# Patient Record
Sex: FEMALE | Race: BLACK OR AFRICAN AMERICAN | Employment: FULL TIME | URBAN - METROPOLITAN AREA
[De-identification: names, ages, dates, MRNs, and addresses within clinical notes are randomized per-mention and may not be internally consistent; named-entity substitution may affect disease eponyms.]

---

## 2018-01-30 ENCOUNTER — OFFICE VISIT (OUTPATIENT)
Dept: FAMILY MEDICINE CLINIC | Age: 28
End: 2018-01-30

## 2018-01-30 VITALS
OXYGEN SATURATION: 98 % | WEIGHT: 217.5 LBS | SYSTOLIC BLOOD PRESSURE: 100 MMHG | RESPIRATION RATE: 16 BRPM | HEIGHT: 59 IN | DIASTOLIC BLOOD PRESSURE: 68 MMHG | TEMPERATURE: 98.5 F | BODY MASS INDEX: 43.85 KG/M2 | HEART RATE: 74 BPM

## 2018-01-30 DIAGNOSIS — J30.89 NON-SEASONAL ALLERGIC RHINITIS, UNSPECIFIED CHRONICITY, UNSPECIFIED TRIGGER: ICD-10-CM

## 2018-01-30 DIAGNOSIS — J45.40 MODERATE PERSISTENT ASTHMA WITHOUT COMPLICATION: Primary | ICD-10-CM

## 2018-01-30 PROBLEM — E66.01 OBESITY, MORBID (HCC): Status: ACTIVE | Noted: 2018-01-30

## 2018-01-30 RX ORDER — LORATADINE 10 MG/1
10 TABLET ORAL DAILY
Qty: 30 TAB | Refills: 5 | Status: SHIPPED | OUTPATIENT
Start: 2018-01-30 | End: 2021-05-10 | Stop reason: SDUPTHER

## 2018-01-30 RX ORDER — DULOXETIN HYDROCHLORIDE 60 MG/1
60 CAPSULE, DELAYED RELEASE ORAL DAILY
Qty: 30 CAP | Refills: 5 | Status: SHIPPED | OUTPATIENT
Start: 2018-01-30 | End: 2021-05-10 | Stop reason: SDUPTHER

## 2018-01-30 RX ORDER — LORATADINE 10 MG/1
10 TABLET ORAL
COMMUNITY
End: 2018-01-30 | Stop reason: SDUPTHER

## 2018-01-30 RX ORDER — MONTELUKAST SODIUM 10 MG/1
10 TABLET ORAL DAILY
Qty: 30 TAB | Refills: 5 | Status: SHIPPED | OUTPATIENT
Start: 2018-01-30 | End: 2021-05-10 | Stop reason: SDUPTHER

## 2018-01-30 RX ORDER — TRAZODONE HYDROCHLORIDE 100 MG/1
100 TABLET ORAL
Qty: 30 TAB | Refills: 5 | Status: SHIPPED | OUTPATIENT
Start: 2018-01-30 | End: 2021-05-10 | Stop reason: SDUPTHER

## 2018-01-30 RX ORDER — MONTELUKAST SODIUM 10 MG/1
10 TABLET ORAL DAILY
COMMUNITY
End: 2018-01-30 | Stop reason: SDUPTHER

## 2018-01-30 RX ORDER — CITALOPRAM 20 MG/1
TABLET, FILM COATED ORAL DAILY
COMMUNITY
End: 2018-01-30 | Stop reason: ALTCHOICE

## 2018-01-30 NOTE — PROGRESS NOTES
1. Have you been to the ER, urgent care clinic since your last visit? Hospitalized since your last visit? No    2. Have you seen or consulted any other health care providers outside of the [address] [place] [address] since your last visit? Include any pap smears or colon screening. No    Chief Complaint   Patient presents with    Establish Care    Anxiety     x 1 1/2 yrs & has been treated but out of meds for 3 mo.  Medication Refill     Pt states she has anxiety & depression x 1 1/2 yrs and has been treated but ran out of meds for 3 mo.

## 2018-01-30 NOTE — MR AVS SNAPSHOT
[address] [address] 
[phone] Patient: Stan Lanes MRN: [id number] Visit Information Date & Time Provider Department Dept. Phone Encounter #  
 1/30/2018  2:00 PM Kayla Bernal NP [facility] [facility] [phone] [id number] Follow-up Instructions Return in about 3 weeks (around 2/20/2018) for med check. Allergies as of 1/30/2018  Review Complete On: 1/30/2018 By: Kayla Bernal NP Severity Noted Reaction Type Reactions Azithromycin  02/22/2016    Rash Rash with IV infusion. Has taken tablets since without a reaction Dilaudid [Hydromorphone]  02/22/2016    Swelling Morphine  02/22/2016    Swelling Current Immunizations  Never Reviewed No immunizations on file. Not reviewed this visit You Were Diagnosed With   
  
 Codes Comments Moderate persistent asthma without complication    -  Primary ICD-10-CM: J45.40 ICD-9-CM: 493.90 Non-seasonal allergic rhinitis, unspecified chronicity, unspecified trigger     ICD-10-CM: J30.89 ICD-9-CM: 477.8 Vitals BP Pulse Temp Resp Height(growth percentile) Weight(growth percentile) 100/68 74 98.5 °F (36.9 °C) (Oral) 16 4' 11\" (1.499 m) 217 lb 8 oz (98.7 kg) LMP SpO2 BMI OB Status Smoking Status 01/18/2018 (Exact Date) 98% 43.93 kg/m2 Having regular periods Never Smoker Vitals History BMI and BSA Data Body Mass Index Body Surface Area  
 43.93 kg/m 2 2.03 m 2 Preferred Pharmacy Pharmacy Name Phone [facility] [address], [address] [facility] [phone] Your Updated Medication List  
  
   
This list is accurate as of: 1/30/18  2:44 PM.  Always use your most recent med list.  
  
  
  
  
 albuterol 90 mcg/actuation inhaler Commonly known as:  PROVENTIL HFA, VENTOLIN HFA, PROAIR HFA  
 Take 2 Puffs by inhalation every four (4) hours as needed for Wheezing. Indications: ACUTE ASTHMA ATTACK DULoxetine 60 mg capsule Commonly known as:  CYMBALTA Take 1 Cap by mouth daily. loratadine 10 mg tablet Commonly known as:  Lajune [place] Take 1 Tab by mouth daily. montelukast 10 mg tablet Commonly known as:  SINGULAIR Take 1 Tab by mouth daily. traZODone 100 mg tablet Commonly known as:  Lunette Grate Take 1 Tab by mouth nightly. Prescriptions Sent to Pharmacy Refills  
 montelukast (SINGULAIR) 10 mg tablet 5 Sig: Take 1 Tab by mouth daily. Class: Normal  
 Pharmacy: [address] Ph #: [phone] Route: Oral  
 loratadine (CLARITIN) 10 mg tablet 5 Sig: Take 1 Tab by mouth daily. Class: Normal  
 Pharmacy: [address] Ph #: [phone] Route: Oral  
 DULoxetine (CYMBALTA) 60 mg capsule 5 Sig: Take 1 Cap by mouth daily. Class: Normal  
 Pharmacy: [address] Ph #: [phone] Route: Oral  
 traZODone (DESYREL) 100 mg tablet 5 Sig: Take 1 Tab by mouth nightly. Class: Normal  
 Pharmacy: [address] Ph #: [phone] Route: Oral  
  
Follow-up Instructions Return in about 3 weeks (around 2/20/2018) for med check. Introducing [facility]! Maribell Gunn introduces [employer] patient portal. Now you can access parts of your medical record, email your doctor's office, and request medication refills online. 1. In your internet browser, go to [email or web address] 2. Click on the First Time User? Click Here link in the Sign In box. You will see the New Member Sign Up page. 3. Enter your [employer] Access Code exactly as it appears below. You will not need to use this code after youve completed the sign-up process. If you do not sign up before the expiration date, you must request a new code. · [employer] Access Code: [email or web address] Expires: 4/30/2018  2:34 PM 
 
4. Enter the last four digits of your Social Security Number (xxxx) and Date of Birth (mm/dd/yyyy) as indicated and click Submit. You will be taken to the next sign-up page. 5. Create a [employer] ID. This will be your [employer] login ID and cannot be changed, so think of one that is secure and easy to remember. 6. Create a [employer] password. You can change your password at any time. 7. Enter your Password Reset Question and Answer. This can be used at a later time if you forget your password. 8. Enter your e-mail address. You will receive e-mail notification when new information is available in [address] 9. Click Sign Up. You can now view and download portions of your medical record. 10. Click the Download Summary menu link to download a portable copy of your medical information. If you have questions, please visit the Frequently Asked Questions section of the [employer] website. Remember, [employer] is NOT to be used for urgent needs. For medical emergencies, dial 911. Now available from your iPhone and Android! Please provide this summary of care documentation to your next provider. Your primary care clinician is listed as NONE. If you have any questions after today's visit, please call [phone].

## 2018-01-31 NOTE — PROGRESS NOTES
HISTORY OF PRESENT ILLNESS  Eris Spence is a 33 y.o. female. HPI  New patient to the practice  Long pmh of depression  Used to be on celexa but stopped because it did not feel effective  Stress with work and home life  Also states that she does not sleep well and toss and turn all night due to worry  Needs to get refill of her asthma/allergy meds, takes year round, sx stable currently    The FamHx, SocHx, MedHx, Medication, and Allergy lists have been reviewed and updated in the chart. ROS  A comprehensive review of system was obtained and negative except findings in the HPI    Visit Vitals    /68    Pulse 74    Temp 98.5 °F (36.9 °C) (Oral)    Resp 16    Ht 4' 11\" (1.499 m)    Wt 217 lb 8 oz (98.7 kg)    LMP 01/18/2018 (Exact Date)    SpO2 98%    BMI 43.93 kg/m2     Physical Exam   Constitutional: She is oriented to person, place, and time. She appears well-developed and well-nourished. Neck: No JVD present. Cardiovascular: Normal rate, regular rhythm and intact distal pulses. Exam reveals no gallop and no friction rub. No murmur heard. Pulmonary/Chest: Effort normal and breath sounds normal. No respiratory distress. She has no wheezes. Musculoskeletal: She exhibits no edema. Neurological: She is alert and oriented to person, place, and time. Skin: Skin is warm. Nursing note and vitals reviewed. ASSESSMENT and PLAN  Encounter Diagnoses   Name Primary?     Moderate persistent asthma without complication Yes    Non-seasonal allergic rhinitis, unspecified chronicity, unspecified trigger  Anxiety      Orders Placed This Encounter    montelukast (SINGULAIR) 10 mg tablet    loratadine (CLARITIN) 10 mg tablet    DULoxetine (CYMBALTA) 60 mg capsule    traZODone (DESYREL) 100 mg tablet     Refills added to her allergy/asthma meds  Start cymbalta 60mg a day and trazodone for sleep  Reviewed how to take and what to expect  Recheck 3 weeks    I have discussed the diagnosis with the patient and the intended plan as seen in the above orders. The patient has received an after-visit summary and questions were answered concerning future plans. Patient conveyed understanding of the plan at the time of the visit.     Malika Scott MSN, ANP  1/30/2018

## 2018-02-20 ENCOUNTER — OFFICE VISIT (OUTPATIENT)
Dept: FAMILY MEDICINE CLINIC | Age: 28
End: 2018-02-20

## 2018-02-20 VITALS
OXYGEN SATURATION: 98 % | HEIGHT: 59 IN | SYSTOLIC BLOOD PRESSURE: 118 MMHG | RESPIRATION RATE: 16 BRPM | BODY MASS INDEX: 43.57 KG/M2 | TEMPERATURE: 98.8 F | DIASTOLIC BLOOD PRESSURE: 72 MMHG | HEART RATE: 78 BPM | WEIGHT: 216.13 LBS

## 2018-02-20 DIAGNOSIS — J45.901 MODERATE ASTHMA WITH ACUTE EXACERBATION, UNSPECIFIED WHETHER PERSISTENT: ICD-10-CM

## 2018-02-20 DIAGNOSIS — F32.A DEPRESSION, UNSPECIFIED DEPRESSION TYPE: Primary | ICD-10-CM

## 2018-02-20 RX ORDER — ALBUTEROL SULFATE 90 UG/1
2 AEROSOL, METERED RESPIRATORY (INHALATION)
Qty: 1 INHALER | Refills: 5 | Status: SHIPPED | OUTPATIENT
Start: 2018-02-20 | End: 2021-05-10 | Stop reason: SDUPTHER

## 2018-02-20 NOTE — PROGRESS NOTES
HISTORY OF PRESENT ILLNESS  Lashay Peterson is a 33 y.o. female. HPI  Started cymbalta and trazodone last visit for anxiety and depression  Sleep patterns are much improved  Does not see a big difference in stress yet, maybe 10%  Willing to give it more time    Needs albuterol MDI refill for seasonal allergies/asthma    ROS  A comprehensive review of system was obtained and negative except findings in the HPI    Visit Vitals    /72    Pulse 78    Temp 98.8 °F (37.1 °C) (Oral)    Resp 16    Ht 4' 11\" (1.499 m)    Wt 216 lb 2 oz (98 kg)    SpO2 98%    BMI 43.65 kg/m2     Physical Exam   Constitutional: She is oriented to person, place, and time. She appears well-developed and well-nourished. Neck: No JVD present. Cardiovascular: Normal rate, regular rhythm and intact distal pulses. Exam reveals no gallop and no friction rub. No murmur heard. Pulmonary/Chest: Effort normal and breath sounds normal. No respiratory distress. She has no wheezes. Musculoskeletal: She exhibits no edema. Neurological: She is alert and oriented to person, place, and time. Skin: Skin is warm. Nursing note and vitals reviewed. ASSESSMENT and PLAN  Encounter Diagnoses   Name Primary?  Depression, unspecified depression type Yes    Moderate asthma with acute exacerbation, unspecified whether persistent      Orders Placed This Encounter    albuterol (PROVENTIL HFA, VENTOLIN HFA, PROAIR HFA) 90 mcg/actuation inhaler     Given refill of albuterol MDI for prn use  Will recheck 3 weeks and give cymbalta a longer chance to work  Cont trazodone for sleep    I have discussed the diagnosis with the patient and the intended plan as seen in the above orders. The patient has received an after-visit summary and questions were answered concerning future plans. Patient conveyed understanding of the plan at the time of the visit.     Max Yi, MSN, ANP  2/20/2018

## 2018-02-20 NOTE — MR AVS SNAPSHOT
[address] [address] 
[phone] Patient: Chantell Dwyer MRN: [id number] Visit Information Date & Time Provider Department Dept. Phone Encounter #  
 2/20/2018  8:45 AM Linda Kohler NP [address] [facility] [phone] [id number] Follow-up Instructions Return in about 3 weeks (around 3/13/2018) for med check. Upcoming Health Maintenance Date Due  
 HPV AGE 9Y-34Y (1 of 3 - Female 3 Dose Series) 2/24/2002 Pneumococcal 19-64 Medium Risk (1 of 1 - PPSV23) 2/24/2010 DTaP/Tdap/Td series (1 - Tdap) 2/24/2012 PAP AKA CERVICAL CYTOLOGY 2/24/2012 Influenza Age 5 to Adult 8/1/2017 Allergies as of 2/20/2018  Review Complete On: 2/20/2018 By: Gomez Murdock LPN Severity Noted Reaction Type Reactions Azithromycin  02/22/2016    Rash Rash with IV infusion. Has taken tablets since without a reaction Dilaudid [Hydromorphone]  02/22/2016    Swelling Morphine  02/22/2016    Swelling Current Immunizations  Never Reviewed No immunizations on file. Not reviewed this visit You Were Diagnosed With   
  
 Codes Comments Depression, unspecified depression type    -  Primary ICD-10-CM: F32.9 ICD-9-CM: 899 Moderate asthma with acute exacerbation, unspecified whether persistent     ICD-10-CM: J45.901 ICD-9-CM: 922.59 Vitals BP Pulse Temp Resp Height(growth percentile) Weight(growth percentile) 118/72 78 98.8 °F (37.1 °C) (Oral) 16 4' 11\" (1.499 m) 216 lb 2 oz (98 kg) SpO2 BMI OB Status Smoking Status 98% 43.65 kg/m2 Having regular periods Never Smoker Vitals History BMI and BSA Data Body Mass Index Body Surface Area  
 43.65 kg/m 2 2.02 m 2 Preferred Pharmacy Pharmacy Name Phone [facility] [address], [address] [facility] [phone] Your Updated Medication List  
  
   
This list is accurate as of: 2/20/18  9:18 AM.  Always use your most recent med list.  
  
  
  
  
 albuterol 90 mcg/actuation inhaler Commonly known as:  PROVENTIL HFA, VENTOLIN HFA, PROAIR HFA Take 2 Puffs by inhalation every four (4) hours as needed for Wheezing. Indications: Acute Asthma Attack DULoxetine 60 mg capsule Commonly known as:  CYMBALTA Take 1 Cap by mouth daily. loratadine 10 mg tablet Commonly known as:  [place] Last Take 1 Tab by mouth daily. montelukast 10 mg tablet Commonly known as:  SINGULAIR Take 1 Tab by mouth daily. traZODone 100 mg tablet Commonly known as:  Stevphen Hacker Take 1 Tab by mouth nightly. Prescriptions Sent to Pharmacy Refills  
 albuterol (PROVENTIL HFA, VENTOLIN HFA, PROAIR HFA) 90 mcg/actuation inhaler 5 Sig: Take 2 Puffs by inhalation every four (4) hours as needed for Wheezing. Indications: Acute Asthma Attack Class: Normal  
 Pharmacy: [facility] [address] [place] [address] #: [phone] Route: Inhalation Follow-up Instructions Return in about 3 weeks (around 3/13/2018) for med check. Introducing [facility]! Gillian Jacob introduces [employer] patient portal. Now you can access parts of your medical record, email your doctor's office, and request medication refills online. 1. In your internet browser, go to [email or web address] 2. Click on the First Time User? Click Here link in the Sign In box. You will see the New Member Sign Up page. 3. Enter your [employer] Access Code exactly as it appears below. You will not need to use this code after youve completed the sign-up process. If you do not sign up before the expiration date, you must request a new code. · [employer] Access Code: [email or web address] Expires: 4/30/2018  2:34 PM 
 
 4. Enter the last four digits of your Social Security Number (xxxx) and Date of Birth (mm/dd/yyyy) as indicated and click Submit. You will be taken to the next sign-up page. 5. Create a [employer] ID. This will be your [employer] login ID and cannot be changed, so think of one that is secure and easy to remember. 6. Create a [employer] password. You can change your password at any time. 7. Enter your Password Reset Question and Answer. This can be used at a later time if you forget your password. 8. Enter your e-mail address. You will receive e-mail notification when new information is available in [address] 9. Click Sign Up. You can now view and download portions of your medical record. 10. Click the Download Summary menu link to download a portable copy of your medical information. If you have questions, please visit the Frequently Asked Questions section of the [employer] website. Remember, [employer] is NOT to be used for urgent needs. For medical emergencies, dial 911. Now available from your iPhone and Android! Please provide this summary of care documentation to your next provider. Your primary care clinician is listed as NONE. If you have any questions after today's visit, please call [phone].

## 2018-02-20 NOTE — PROGRESS NOTES
1. Have you been to the ER, urgent care clinic since your last visit? Hospitalized since your last visit? No    2. Have you seen or consulted any other health care providers outside of the [address] [place] [address] since your last visit? Include any pap smears or colon screening.  No    Chief Complaint   Patient presents with    Follow-up     3 wk f/u, med ck

## 2018-08-15 ENCOUNTER — OFFICE VISIT (OUTPATIENT)
Dept: FAMILY MEDICINE CLINIC | Age: 28
End: 2018-08-15

## 2018-08-15 VITALS
TEMPERATURE: 98.3 F | HEART RATE: 60 BPM | BODY MASS INDEX: 43.75 KG/M2 | WEIGHT: 217 LBS | SYSTOLIC BLOOD PRESSURE: 109 MMHG | HEIGHT: 59 IN | OXYGEN SATURATION: 99 % | RESPIRATION RATE: 16 BRPM | DIASTOLIC BLOOD PRESSURE: 72 MMHG

## 2018-08-15 DIAGNOSIS — F41.9 ANXIETY: Primary | ICD-10-CM

## 2018-08-15 NOTE — PATIENT INSTRUCTIONS

## 2018-08-15 NOTE — PROGRESS NOTES
Chief Complaint   Patient presents with    Medication Evaluation     Cymbalta and Trazodone     she is a 33y.o. year old female who presents for evalution. Pt states was seeing Gurpreet Neto at beginning of year, was given Trazodone and Cymbalta. Then stopped coming because she did not feel like medication was working. Pt states has been cycling through different family practice providers and practices and no one can figure out how to treat her appropraitely. Pt states she is unsure of her diagnosis and feels like no one is treating her properly. Reviewed PmHx, RxHx, FmHx, SocHx, AllgHx and updated and dated in the chart. Review of Systems - negative except as listed above in the HPI    Objective:     Vitals:    08/15/18 0859   BP: 109/72   Pulse: 60   Resp: 16   Temp: 98.3 °F (36.8 °C)   TempSrc: Oral   SpO2: 99%   Weight: 217 lb (98.4 kg)   Height: 4' 11\" (1.499 m)     Physical Examination: General appearance - alert, well appearing, and in no distress  Mental status - alert, oriented to person, place, and time, anxious  Chest - clear to auscultation, no wheezes, rales or rhonchi, symmetric air entry  Heart - normal rate, regular rhythm, normal S1, S2, no murmurs, rubs, clicks or gallops    Assessment/ Plan:   Diagnoses and all orders for this visit:    1. Anxiety  -     REFERRAL TO PSYCHIATRY  Pt given names of 3 different psychiatrists for treatment and evaluation. Pt voiced understanding regarding plan of care. Follow-up Disposition:  Return if symptoms worsen or fail to improve. I have discussed the diagnosis with the patient and the intended plan as seen in the above orders. The patient has received an after-visit summary and questions were answered concerning future plans.      Medication Side Effects and Warnings were discussed with patient    Lorrie Smith NP

## 2018-08-15 NOTE — MR AVS SNAPSHOT
[address] [address] 
[phone] Patient: Garima Shearer MRN: [id number] LFP:4/43/8369 Visit Information Date & Time Provider Department Dept. Phone Encounter #  
 8/15/2018  8:45 AM Joby Johnson NP 1566 [facility] [phone] [id number] Follow-up Instructions Return if symptoms worsen or fail to improve. Upcoming Health Maintenance Date Due Pneumococcal 19-64 Medium Risk (1 of 1 - PPSV23) 2/24/2010 DTaP/Tdap/Td series (1 - Tdap) 2/24/2012 PAP AKA CERVICAL CYTOLOGY 2/24/2012 Influenza Age 5 to Adult 8/1/2018 Allergies as of 8/15/2018  Review Complete On: 8/15/2018 By: Maribel Tinajero LPN Severity Noted Reaction Type Reactions Azithromycin  02/22/2016    Rash Rash with IV infusion. Has taken tablets since without a reaction Dilaudid [Hydromorphone]  02/22/2016    Swelling Morphine  02/22/2016    Swelling Current Immunizations  Never Reviewed No immunizations on file. Not reviewed this visit You Were Diagnosed With   
  
 Codes Comments Anxiety    -  Primary ICD-10-CM: F41.9 ICD-9-CM: 300.00 Vitals BP Pulse Temp Resp Height(growth percentile) Weight(growth percentile) 109/72 60 98.3 °F (36.8 °C) (Oral) 16 4' 11\" (1.499 m) 217 lb (98.4 kg) LMP SpO2 BMI OB Status Smoking Status 08/04/2018 99% 43.83 kg/m2 Having regular periods Never Smoker Vitals History BMI and BSA Data Body Mass Index Body Surface Area  
 43.83 kg/m 2 2.02 m 2 [facility] [phone] Your Updated Medication List  
  
   
This list is accurate as of 8/15/18  9:08 AM.  Always use your most recent med list.  
  
  
  
  
 albuterol 90 mcg/actuation inhaler Commonly known as:  PROVENTIL HFA, VENTOLIN HFA, PROAIR HFA  
 Take 2 Puffs by inhalation every four (4) hours as needed for Wheezing. Indications: Acute Asthma Attack DULoxetine 60 mg capsule Commonly known as:  CYMBALTA Take 1 Cap by mouth daily. loratadine 10 mg tablet Commonly known as:  Drena Magic Take 1 Tab by mouth daily. montelukast 10 mg tablet Commonly known as:  SINGULAIR Take 1 Tab by mouth daily. traZODone 100 mg tablet Commonly known as:  Lemmie Sarah Take 1 Tab by mouth nightly. We Performed the Following REFERRAL TO PSYCHIATRY [REF91 Custom] Follow-up Instructions Return if symptoms worsen or fail to improve. Referral Information Referral ID Referred By Referred To  
  
 [id number] Mady PARKER, [address] [facility] [address] [address] Phone: [phone] Fax: [phone] Visits Status Start Date End Date 1 New Request 8/15/18 8/15/19 If your referral has a status of pending review or denied, additional information will be sent to support the outcome of this decision. Patient Instructions Anxiety Disorder: Care Instructions Your Care Instructions Anxiety is a normal reaction to stress. Difficult situations can cause you to have symptoms such as sweaty palms and a nervous feeling. In an anxiety disorder, the symptoms are far more severe. Constant worry, muscle tension, trouble sleeping, nausea and diarrhea, and other symptoms can make normal daily activities difficult or impossible. These symptoms may occur for no reason, and they can affect your work, school, or social life. Medicines, counseling, and self-care can all help. Follow-up care is a key part of your treatment and safety. Be sure to make and go to all appointments, and call your doctor if you are having problems.  It's also a good idea to know your test results and keep a list of the medicines you take. How can you care for yourself at home? · Take medicines exactly as directed. Call your doctor if you think you are having a problem with your medicine. · Go to your counseling sessions and follow-up appointments. · Recognize and accept your anxiety. Then, when you are in a situation that makes you anxious, say to yourself, \"This is not an emergency. I feel uncomfortable, but I am not in danger. I can keep going even if I feel anxious. \" · Be kind to your body: ¨ Relieve tension with exercise or a massage. ¨ Get enough rest. 
¨ Avoid alcohol, caffeine, nicotine, and illegal drugs. They can increase your anxiety level and cause sleep problems. ¨ Learn and do relaxation techniques. See below for more about these techniques. · Engage your mind. Get out and do something you enjoy. Go to a funny movie, or take a walk or hike. Plan your day. Having too much or too little to do can make you anxious. · Keep a record of your symptoms. Discuss your fears with a good friend or family member, or join a support group for people with similar problems. Talking to others sometimes relieves stress. · Get involved in social groups, or volunteer to help others. Being alone sometimes makes things seem worse than they are. · Get at least 30 minutes of exercise on most days of the week to relieve stress. Walking is a good choice. You also may want to do other activities, such as running, swimming, cycling, or playing tennis or team sports. Relaxation techniques Do relaxation exercises 10 to 20 minutes a day. You can play soothing, relaxing music while you do them, if you wish. · Tell others in your house that you are going to do your relaxation exercises. Ask them not to disturb you. · Find a comfortable place, away from all distractions and noise. · Lie down on your back, or sit with your back straight. · Focus on your breathing. Make it slow and steady. · Breathe in through your nose. Breathe out through either your nose or mouth. · Breathe deeply, filling up the area between your navel and your rib cage. Breathe so that your belly goes up and down. · Do not hold your breath. · Breathe like this for 5 to 10 minutes. Notice the feeling of calmness throughout your whole body. As you continue to breathe slowly and deeply, relax by doing the following for another 5 to 10 minutes: · Tighten and relax each muscle group in your body. You can begin at your toes and work your way up to your head. · Imagine your muscle groups relaxing and becoming heavy. · Empty your mind of all thoughts. · Let yourself relax more and more deeply. · Become aware of the state of calmness that surrounds you. · When your relaxation time is over, you can bring yourself back to alertness by moving your fingers and toes and then your hands and feet and then stretching and moving your entire body. Sometimes people fall asleep during relaxation, but they usually wake up shortly afterward. · Always give yourself time to return to full alertness before you drive a car or do anything that might cause an accident if you are not fully alert. Never play a relaxation tape while you drive a car. When should you call for help? Call 911 anytime you think you may need emergency care. For example, call if: 
  · You feel you cannot stop from hurting yourself or someone else.  
Chu Jimenez the numbers for these national suicide hotlines: 1-453-828-TALK (5-568.507.9612) and 4-800-EVZHYZJ (7-965.180.1661). If you or someone you know talks about suicide or feeling hopeless, get help right away. 
 Watch closely for changes in your health, and be sure to contact your doctor if: 
  · You have anxiety or fear that affects your life.  
  · You have symptoms of anxiety that are new or different from those you had before. Where can you learn more? Go to http://mandy-jagdish.info/. Enter P754 in the search box to learn more about \"Anxiety Disorder: Care Instructions. \" Current as of: December 7, 2017 Content Version: 11.7 © 4105-3626 [employer]. Care instructions adapted under license by [employer] (which disclaims liability or warranty for this information). If you have questions about a medical condition or this instruction, always ask your healthcare professional. [address] any warranty or liability for your use of this information. Introducing [facility] & HEALTH SERVICES! Lynn Whitmore introduces AppHarbor patient portal. Now you can access parts of your medical record, email your doctor's office, and request medication refills online. 1. In your internet browser, go to [email or web address] 2. Click on the First Time User? Click Here link in the Sign In box. You will see the New Member Sign Up page. 3. Enter your AppHarbor Access Code exactly as it appears below. You will not need to use this code after youve completed the sign-up process. If you do not sign up before the expiration date, you must request a new code. · AppHarbor Access Code: [email or web address] Expires: 11/13/2018  9:08 AM 
 
4. Enter the last four digits of your Social Security Number (xxxx) and Date of Birth (mm/dd/yyyy) as indicated and click Submit. You will be taken to the next sign-up page. 5. Create a AppHarbor ID. This will be your AppHarbor login ID and cannot be changed, so think of one that is secure and easy to remember. 6. Create a AppHarbor password. You can change your password at any time. 7. Enter your Password Reset Question and Answer. This can be used at a later time if you forget your password. 8. Enter your e-mail address. You will receive e-mail notification when new information is available in [address] 9. Click Sign Up. You can now view and download portions of your medical record. 10. Click the Download Summary menu link to download a portable copy of your medical information. If you have questions, please visit the Frequently Asked Questions section of the [employer] website. Remember, Chibwe is NOT to be used for urgent needs. For medical emergencies, dial 911. Now available from your iPhone and Android! Please provide this summary of care documentation to your next provider. Your primary care clinician is listed as NONE. If you have any questions after today's visit, please call [phone].

## 2018-08-15 NOTE — PROGRESS NOTES
1. Have you been to the ER, urgent care clinic since your last visit? Hospitalized since your last visit? Yes, [facility], 8/13/18    2. Have you seen or consulted any other health care providers outside of the [address] [place] [address] since your last visit? Include any pap smears or colon screening.  No   Chief Complaint   Patient presents with    Medication Evaluation     Cymbalta and Trazodone

## 2018-09-13 ENCOUNTER — OFFICE VISIT (OUTPATIENT)
Dept: FAMILY MEDICINE CLINIC | Age: 28
End: 2018-09-13

## 2018-09-13 VITALS
BODY MASS INDEX: 43.95 KG/M2 | DIASTOLIC BLOOD PRESSURE: 82 MMHG | SYSTOLIC BLOOD PRESSURE: 120 MMHG | HEART RATE: 63 BPM | HEIGHT: 59 IN | WEIGHT: 218 LBS | TEMPERATURE: 98.9 F | RESPIRATION RATE: 16 BRPM | OXYGEN SATURATION: 99 %

## 2018-09-13 DIAGNOSIS — N92.6 MISSED MENSES: Primary | ICD-10-CM

## 2018-09-13 NOTE — PROGRESS NOTES
1. Have you been to the ER, urgent care clinic since your last visit? Hospitalized since your last visit? Yes, [facility], 9/12/18    2. Have you seen or consulted any other health care providers outside of the [address] [place] [address] since your last visit? Include any pap smears or colon screening.  No     Chief Complaint   Patient presents with    Menstrual Problem     Last Period 7/20/18

## 2018-09-13 NOTE — MR AVS SNAPSHOT
[address] [address] 
[phone] Patient: Savannah Keita MRN: [id number] TDM:8/01/3888 Visit Information Date & Time Provider Department Dept. Phone Encounter #  
 9/13/2018  7:15 AM Jimmy Matt NP [address] [facility] [phone] [id number] Follow-up Instructions Return if symptoms worsen or fail to improve. Upcoming Health Maintenance Date Due Pneumococcal 19-64 Medium Risk (1 of 1 - PPSV23) 2/24/2010 DTaP/Tdap/Td series (1 - Tdap) 2/24/2012 PAP AKA CERVICAL CYTOLOGY 2/24/2012 Influenza Age 5 to Adult 8/1/2018 Allergies as of 9/13/2018  Review Complete On: 9/13/2018 By: Cecile Pryor LPN Severity Noted Reaction Type Reactions Azithromycin  02/22/2016    Rash Rash with IV infusion. Has taken tablets since without a reaction Dilaudid [Hydromorphone]  02/22/2016    Swelling Morphine  02/22/2016    Swelling Current Immunizations  Never Reviewed No immunizations on file. Not reviewed this visit You Were Diagnosed With   
  
 Codes Comments Missed menses    -  Primary ICD-10-CM: N92.6 ICD-9-CM: 626.4 Vitals BP Pulse Temp Resp Height(growth percentile) Weight(growth percentile) 120/82 63 98.9 °F (37.2 °C) (Oral) 16 4' 11\" (1.499 m) 218 lb (98.9 kg) LMP SpO2 BMI OB Status Smoking Status 07/20/2018 (Exact Date) 99% 44.03 kg/m2 Unknown Never Smoker BMI and BSA Data Body Mass Index Body Surface Area 44.03 kg/m 2 2.03 m 2 Preferred Pharmacy Pharmacy Name Phone [facility], [address] [place] [address] [phone] Your Updated Medication List  
  
   
This list is accurate as of 9/13/18  7:37 AM.  Always use your most recent med list.  
  
  
  
  
 albuterol 90 mcg/actuation inhaler Commonly known as:  PROVENTIL HFA, VENTOLIN HFA, PROAIR HFA  
 Take 2 Puffs by inhalation every four (4) hours as needed for Wheezing. Indications: Acute Asthma Attack DULoxetine 60 mg capsule Commonly known as:  CYMBALTA Take 1 Cap by mouth daily. loratadine 10 mg tablet Commonly known as:  Lorie Jessica Take 1 Tab by mouth daily. montelukast 10 mg tablet Commonly known as:  SINGULAIR Take 1 Tab by mouth daily. traZODone 100 mg tablet Commonly known as:  Peck Bump Take 1 Tab by mouth nightly. We Performed the Following BETA HCG, QT [id number] CPT(R)] [facility] AND LH [19668 CPT(R)] TSH 3RD GENERATION [52502 CPT(R)] Follow-up Instructions Return if symptoms worsen or fail to improve. Introducing [facility] & HEALTH SERVICES! [facility] introduces ZAPR patient portal. Now you can access parts of your medical record, email your doctor's office, and request medication refills online. 1. In your internet browser, go to [email or web address] 2. Click on the First Time User? Click Here link in the Sign In box. You will see the New Member Sign Up page. 3. Enter your ZAPR Access Code exactly as it appears below. You will not need to use this code after youve completed the sign-up process. If you do not sign up before the expiration date, you must request a new code. · ZAPR Access Code: [email or web address] Expires: 11/13/2018  9:08 AM 
 
4. Enter the last four digits of your Social Security Number (xxxx) and Date of Birth (mm/dd/yyyy) as indicated and click Submit. You will be taken to the next sign-up page. 5. Create a ZAPR ID. This will be your ZAPR login ID and cannot be changed, so think of one that is secure and easy to remember. 6. Create a ZAPR password. You can change your password at any time. 7. Enter your Password Reset Question and Answer. This can be used at a later time if you forget your password. 8. Enter your e-mail address.  You will receive e-mail notification when new information is available in Privepass. 9. Click Sign Up. You can now view and download portions of your medical record. 10. Click the Download Summary menu link to download a portable copy of your medical information. If you have questions, please visit the Frequently Asked Questions section of the Privepass website. Remember, Privepass is NOT to be used for urgent needs. For medical emergencies, dial 911. Now available from your iPhone and Android! Please provide this summary of care documentation to your next provider. Your primary care clinician is listed as NONE. If you have any questions after today's visit, please call [phone].

## 2018-09-13 NOTE — PROGRESS NOTES
Chief Complaint   Patient presents with    Menstrual Problem     Last Period 7/20/18     she is a 33y.o. year old female who presents for evalution. Pt states has not seen GYN for past 6 years. Pt states has tubes tied. States typically has regular periods, has not had cycle since 7/20/18. Went to [facility] yesterday but refused any pregnancy testing. Only other thing they checked was pt's urine, then was given Bentyl. Here today to discuss. Pt states typically with periods will get sharp pain on R side of pelvis. States has been getting pain intermittently for past few days but period has not yet started. Pt was seen here about a month ago for anxiety but states not worse than normal.  Does not believe this effects her periods. Reviewed PmHx, RxHx, FmHx, SocHx, AllgHx and updated and dated in the chart. Review of Systems - negative except as listed above in the HPI    Objective:     Vitals:    09/13/18 0726   BP: 120/82   Pulse: 63   Resp: 16   Temp: 98.9 °F (37.2 °C)   TempSrc: Oral   SpO2: 99%   Weight: 218 lb (98.9 kg)   Height: 4' 11\" (1.499 m)     Physical Examination: General appearance - alert, well appearing, and in no distress  Chest - clear to auscultation, no wheezes, rales or rhonchi, symmetric air entry  Heart - normal rate, regular rhythm, normal S1, S2, no murmurs, rubs, clicks or gallops  Pelvic - exam declined by the patient    Assessment/ Plan:   Diagnoses and all orders for this visit:    1. Missed menses  -     TSH 3RD GENERATION  -     FSH AND LH  -     BETA HCG, QT  Will decide on F/U after reviewing labs. Discussed obtaining US if all labs normal which pt would like to have done. Pt voiced understanding regarding plan of care. Follow-up Disposition:  Return if symptoms worsen or fail to improve. I have discussed the diagnosis with the patient and the intended plan as seen in the above orders.   The patient has received an after-visit summary and questions were answered concerning future plans.      Medication Side Effects and Warnings were discussed with patient    Palma Sam NP

## 2018-09-14 ENCOUNTER — TELEPHONE (OUTPATIENT)
Dept: FAMILY MEDICINE CLINIC | Age: 28
End: 2018-09-14

## 2018-09-14 LAB
FSH SERPL-ACNC: 2.1 MIU/ML
HCG INTACT+B SERPL-ACNC: <1 MIU/ML
LH SERPL-ACNC: 5.3 MIU/ML
TSH SERPL DL<=0.005 MIU/L-ACNC: 1.99 UIU/ML (ref 0.45–4.5)

## 2018-09-14 NOTE — PROGRESS NOTES
Please inform pt all labs came back normal, pregnancy test was negative. Would she like me to order pelvic US for further evaluation now or give it another month to see if cycle starts again?     Thanks,  N

## 2018-09-14 NOTE — PROGRESS NOTES
Notified pt of results per Jenni Lofton. Pt is going to wait another month before having US. Pt will call back if cycle does not start again.

## 2021-05-10 ENCOUNTER — OFFICE VISIT (OUTPATIENT)
Dept: FAMILY MEDICINE CLINIC | Age: 31
End: 2021-05-10

## 2021-05-10 VITALS
HEART RATE: 63 BPM | SYSTOLIC BLOOD PRESSURE: 106 MMHG | RESPIRATION RATE: 18 BRPM | OXYGEN SATURATION: 98 % | BODY MASS INDEX: 42.54 KG/M2 | TEMPERATURE: 98.5 F | HEIGHT: 59 IN | WEIGHT: 211 LBS | DIASTOLIC BLOOD PRESSURE: 69 MMHG

## 2021-05-10 DIAGNOSIS — J45.41 MODERATE PERSISTENT ASTHMA WITH ACUTE EXACERBATION: Primary | ICD-10-CM

## 2021-05-10 DIAGNOSIS — F41.9 ANXIETY: ICD-10-CM

## 2021-05-10 DIAGNOSIS — F32.A DEPRESSION, UNSPECIFIED DEPRESSION TYPE: ICD-10-CM

## 2021-05-10 PROCEDURE — 99214 OFFICE O/P EST MOD 30 MIN: CPT | Performed by: NURSE PRACTITIONER

## 2021-05-10 RX ORDER — ALBUTEROL SULFATE 90 UG/1
2 AEROSOL, METERED RESPIRATORY (INHALATION)
Qty: 1 INHALER | Refills: 5 | Status: SHIPPED | OUTPATIENT
Start: 2021-05-10 | End: 2022-04-18 | Stop reason: SDUPTHER

## 2021-05-10 RX ORDER — TRAZODONE HYDROCHLORIDE 100 MG/1
100 TABLET ORAL
Qty: 30 TAB | Refills: 5 | Status: SHIPPED | OUTPATIENT
Start: 2021-05-10 | End: 2022-03-23 | Stop reason: SDUPTHER

## 2021-05-10 RX ORDER — LORATADINE 10 MG/1
10 TABLET ORAL DAILY
Qty: 30 TAB | Refills: 5 | Status: SHIPPED | OUTPATIENT
Start: 2021-05-10 | End: 2022-04-22 | Stop reason: SDUPTHER

## 2021-05-10 RX ORDER — DULOXETIN HYDROCHLORIDE 60 MG/1
60 CAPSULE, DELAYED RELEASE ORAL DAILY
Qty: 30 CAP | Refills: 5 | Status: SHIPPED | OUTPATIENT
Start: 2021-05-10 | End: 2022-03-23 | Stop reason: SDUPTHER

## 2021-05-10 RX ORDER — MONTELUKAST SODIUM 10 MG/1
10 TABLET ORAL DAILY
Qty: 30 TAB | Refills: 5 | Status: SHIPPED | OUTPATIENT
Start: 2021-05-10 | End: 2022-03-23 | Stop reason: SDUPTHER

## 2021-05-10 NOTE — PROGRESS NOTES
Chief Complaint   Patient presents with    Asthma     Pt in office today for asthma   -pt states that [facility]  -pt states she was given an antibiotc and prednisone  -pt states she is done with the medication given and she states that she is still having mucous and SOB    1. Have you been to the ER, urgent care clinic since your last visit? Hospitalized since your last visit? [facility]    2. Have you seen or consulted any other health care providers outside of the [address] [place] [address] since your last visit? Include any pap smears or colon screening.  No     Pt has no other concerns

## 2021-05-10 NOTE — PROGRESS NOTES
HISTORY OF PRESENT ILLNESS  Samantha Manuel is a 28 y.o. female. HPI  Chief Complaint   Patient presents with    Asthma     Pt in office today for asthma   -pt states that [facility]  -pt states she was given an antibiotc and prednisone  -pt states she is done with the medication given and she states that she is still having mucous and SOB    She is also having issues with her mood and anxiety  Used to be on cymbalta and trazodone  Wants to restart    ROS  A comprehensive review of system was obtained and negative except findings in the HPI    Visit Vitals  /69 (BP 1 Location: Right arm, BP Patient Position: Sitting)   Pulse 63   Temp 98.5 °F (36.9 °C) (Oral)   Resp 18   Ht 4' 11\" (1.499 m)   Wt 211 lb (95.7 kg)   LMP 04/25/2021   SpO2 98%   BMI 42.62 kg/m²     Physical Exam  Vitals signs and nursing note reviewed. Constitutional:       Appearance: She is well-developed. Comments:      Neck:      Vascular: No JVD. Cardiovascular:      Rate and Rhythm: Normal rate and regular rhythm. Heart sounds: No murmur. No friction rub. No gallop. Pulmonary:      Effort: Pulmonary effort is normal. No respiratory distress. Breath sounds: Wheezing present. No rhonchi. Comments: Distant throughout  Skin:     General: Skin is warm. Neurological:      Mental Status: She is alert and oriented to person, place, and time. ASSESSMENT and PLAN  Encounter Diagnoses   Name Primary?     Moderate persistent asthma with acute exacerbation Yes    Anxiety     Depression, unspecified depression type      Orders Placed This Encounter    albuterol (PROVENTIL HFA, VENTOLIN HFA, PROAIR HFA) 90 mcg/actuation inhaler    montelukast (Singulair) 10 mg tablet    loratadine (CLARITIN) 10 mg tablet    DULoxetine (CYMBALTA) 60 mg capsule    traZODone (DESYREL) 100 mg tablet     Restart her singulair and albuterol  May need to start a daily steroid inhaler pending improvement  Restart Cymbalta and trazodone as well  Reviewed adr/se of med    Follow-up and Dispositions    · Return for 3 weeks asthma and anxiety recheck. I have discussed the diagnosis with the patient and the intended plan as seen in the above orders. The patient has received an after-visit summary and questions were answered concerning future plans. Patient conveyed understanding of the plan at the time of the visit.     Soni Bundy MSN, ANP  5/10/2021

## 2022-03-19 PROBLEM — E66.01 OBESITY, MORBID (HCC): Status: ACTIVE | Noted: 2018-01-30

## 2022-03-23 DIAGNOSIS — F32.A DEPRESSION, UNSPECIFIED DEPRESSION TYPE: ICD-10-CM

## 2022-03-23 DIAGNOSIS — F41.9 ANXIETY: ICD-10-CM

## 2022-03-23 DIAGNOSIS — J45.41 MODERATE PERSISTENT ASTHMA WITH ACUTE EXACERBATION: ICD-10-CM

## 2022-03-23 RX ORDER — MONTELUKAST SODIUM 10 MG/1
10 TABLET ORAL DAILY
Qty: 30 TABLET | Refills: 5 | Status: SHIPPED | OUTPATIENT
Start: 2022-03-23

## 2022-03-23 RX ORDER — TRAZODONE HYDROCHLORIDE 100 MG/1
100 TABLET ORAL
Qty: 30 TABLET | Refills: 5 | Status: SHIPPED | OUTPATIENT
Start: 2022-03-23

## 2022-03-23 RX ORDER — DULOXETIN HYDROCHLORIDE 60 MG/1
60 CAPSULE, DELAYED RELEASE ORAL DAILY
Qty: 30 CAPSULE | Refills: 5 | Status: SHIPPED | OUTPATIENT
Start: 2022-03-23

## 2022-04-14 ENCOUNTER — HOSPITAL ENCOUNTER (EMERGENCY)
Age: 32
Discharge: LEFT BEFORE DISCHARGE | End: 2022-04-14
Payer: MEDICAID

## 2022-04-14 VITALS
DIASTOLIC BLOOD PRESSURE: 101 MMHG | HEIGHT: 59 IN | RESPIRATION RATE: 16 BRPM | OXYGEN SATURATION: 100 % | WEIGHT: 220.68 LBS | HEART RATE: 103 BPM | TEMPERATURE: 98.2 F | BODY MASS INDEX: 44.49 KG/M2 | SYSTOLIC BLOOD PRESSURE: 142 MMHG

## 2022-04-14 DIAGNOSIS — Z53.21 PATIENT LEFT WITHOUT BEING SEEN: Primary | ICD-10-CM

## 2022-04-14 PROCEDURE — 75810000275 HC EMERGENCY DEPT VISIT NO LEVEL OF CARE

## 2022-04-18 DIAGNOSIS — J45.41 MODERATE PERSISTENT ASTHMA WITH ACUTE EXACERBATION: ICD-10-CM

## 2022-04-18 RX ORDER — ALBUTEROL SULFATE 90 UG/1
2 AEROSOL, METERED RESPIRATORY (INHALATION)
Qty: 1 EACH | Refills: 3 | Status: SHIPPED | OUTPATIENT
Start: 2022-04-18

## 2022-04-21 ENCOUNTER — HOSPITAL ENCOUNTER (EMERGENCY)
Age: 32
Discharge: HOME | End: 2022-04-21
Attending: FAMILY MEDICINE
Payer: MEDICAID

## 2022-04-21 ENCOUNTER — APPOINTMENT (OUTPATIENT)
Dept: GENERAL RADIOLOGY | Age: 32
End: 2022-04-21
Attending: FAMILY MEDICINE
Payer: MEDICAID

## 2022-04-21 VITALS
HEART RATE: 80 BPM | WEIGHT: 225 LBS | BODY MASS INDEX: 44.17 KG/M2 | HEIGHT: 60 IN | TEMPERATURE: 98.6 F | SYSTOLIC BLOOD PRESSURE: 134 MMHG | OXYGEN SATURATION: 97 % | RESPIRATION RATE: 18 BRPM | DIASTOLIC BLOOD PRESSURE: 85 MMHG

## 2022-04-21 DIAGNOSIS — J45.21 MILD INTERMITTENT ASTHMA WITH EXACERBATION: Primary | ICD-10-CM

## 2022-04-21 PROCEDURE — 74011000250 HC RX REV CODE- 250: Performed by: FAMILY MEDICINE

## 2022-04-21 PROCEDURE — 94640 AIRWAY INHALATION TREATMENT: CPT

## 2022-04-21 PROCEDURE — 74011250636 HC RX REV CODE- 250/636: Performed by: FAMILY MEDICINE

## 2022-04-21 PROCEDURE — 71045 X-RAY EXAM CHEST 1 VIEW: CPT

## 2022-04-21 PROCEDURE — 99284 EMERGENCY DEPT VISIT MOD MDM: CPT

## 2022-04-21 PROCEDURE — 96372 THER/PROPH/DIAG INJ SC/IM: CPT

## 2022-04-21 RX ORDER — IPRATROPIUM BROMIDE AND ALBUTEROL SULFATE 2.5; .5 MG/3ML; MG/3ML
3 SOLUTION RESPIRATORY (INHALATION)
Status: COMPLETED | OUTPATIENT
Start: 2022-04-21 | End: 2022-04-21

## 2022-04-21 RX ORDER — PREDNISONE 20 MG/1
40 TABLET ORAL DAILY
Qty: 10 TABLET | Refills: 0 | Status: SHIPPED | OUTPATIENT
Start: 2022-04-21 | End: 2022-04-26

## 2022-04-21 RX ADMIN — METHYLPREDNISOLONE SODIUM SUCCINATE 125 MG: 125 INJECTION, POWDER, FOR SOLUTION INTRAMUSCULAR; INTRAVENOUS at 17:59

## 2022-04-21 RX ADMIN — IPRATROPIUM BROMIDE AND ALBUTEROL SULFATE 3 ML: .5; 3 SOLUTION RESPIRATORY (INHALATION) at 17:59

## 2022-04-21 NOTE — ED TRIAGE NOTES
33 yr old in with concerns of upper respiratory infection. Pt with history of asthma, states inhaler is not working for her. Pt states symptoms of been getting worse over the last week. Anaya Costa

## 2022-04-21 NOTE — ED PROVIDER NOTES
EMERGENCY DEPARTMENT HISTORY AND PHYSICAL EXAM      Date: 4/21/2022  Patient Name: Anny Palacios    History of Presenting Illness     Chief Complaint   Patient presents with    Wheezing       History Provided By:     HPI: Anny Palacios, is an extremely pleasant 33 y.o. female presenting to the ED with a chief complaint of wheezing. History of asthma and feels like she is having exacerbation. She has been using her albuterol inhaler but this does not seem to be helping much. No chest pain or shortness of breath. She states her patient has a dog and this seems to exacerbate her symptoms further. No fevers chills rigors. There are no other complaints, changes, or physical findings at this time. PCP: None    No current facility-administered medications on file prior to encounter. Current Outpatient Medications on File Prior to Encounter   Medication Sig Dispense Refill    albuterol (PROVENTIL HFA, VENTOLIN HFA, PROAIR HFA) 90 mcg/actuation inhaler Take 2 Puffs by inhalation every four (4) hours as needed for Wheezing. Indications: asthma attack 1 Each 3    DULoxetine (CYMBALTA) 60 mg capsule Take 1 Capsule by mouth daily. 30 Capsule 5    traZODone (DESYREL) 100 mg tablet Take 1 Tablet by mouth nightly. 30 Tablet 5    montelukast (Singulair) 10 mg tablet Take 1 Tablet by mouth daily. 30 Tablet 5    loratadine (CLARITIN) 10 mg tablet Take 1 Tab by mouth daily.  30 Tab 5       Past History     Past Medical History:  Past Medical History:   Diagnosis Date    Anxiety     Asthma     Depression     Ill-defined condition     Headache    Nausea & vomiting        Past Surgical History:  Past Surgical History:   Procedure Laterality Date    HX GI      Choleycystectomy    HX GYN      3 C-sections    [place] ABDOMEN SURGERY PROC UNLISTED  2014    Ventral Hernia Repair       Family History:  Family History   Problem Relation Age of Onset    Depression Mother     Migraines Mother    Christen Ficoralia Other Mother fibromyalgia    Celiac Disease Mother     Bipolar Disorder Father        Social History:  Social History     Tobacco Use    Smoking status: Never Smoker    Smokeless tobacco: Never Used   Substance Use Topics    Alcohol use: No    Drug use: No       Allergies: Allergies   Allergen Reactions    Azithromycin Rash     Rash with IV infusion. Has taken tablets since without a reaction    Dilaudid [Hydromorphone] Swelling    Morphine Swelling         Review of Systems     Review of Systems   Constitutional: Negative for activity change, appetite change, chills, fatigue and fever. HENT: Negative for congestion and sore throat. Eyes: Negative for photophobia and visual disturbance. Respiratory: Positive for wheezing. Negative for cough and shortness of breath. Cardiovascular: Negative for chest pain, palpitations and leg swelling. Gastrointestinal: Negative for abdominal pain, diarrhea, nausea and vomiting. Endocrine: Negative for cold intolerance and heat intolerance. Musculoskeletal: Negative for gait problem and joint swelling. Skin: Negative for color change and rash. Neurological: Negative for dizziness and headaches. Physical Exam     Physical Exam  Constitutional:       Appearance: She is well-developed. HENT:      Head: Normocephalic and atraumatic. Mouth/Throat:      Mouth: Mucous membranes are moist.      Pharynx: Oropharynx is clear. Eyes:      Conjunctiva/sclera: Conjunctivae normal.      Pupils: Pupils are equal, round, and reactive to light. Cardiovascular:      Rate and Rhythm: Normal rate and regular rhythm. Heart sounds: No murmur heard. Pulmonary:      Effort: No respiratory distress. Breath sounds: No stridor. Wheezing present. No rhonchi or rales. Abdominal:      General: There is no distension. Tenderness: There is no abdominal tenderness. There is no rebound.    Musculoskeletal:      Cervical back: Normal range of motion and neck supple. Skin:     General: Skin is warm and dry. Neurological:      General: No focal deficit present. Mental Status: She is alert and oriented to person, place, and time. Psychiatric:         Mood and Affect: Mood normal.         Behavior: Behavior normal.         Lab and Diagnostic Study Results     Labs -   No results found for this or any previous visit (from the past 12 hour(s)). Radiologic Studies -   @lastxrresult@  CT Results  (Last 48 hours)    None        CXR Results  (Last 48 hours)    None            Medical Decision Making   - I am the first provider for this patient. - I reviewed the vital signs, available nursing notes, past medical history, past surgical history, family history and social history. - Initial assessment performed. The patients presenting problems have been discussed, and they are in agreement with the care plan formulated and outlined with them. I have encouraged them to ask questions as they arise throughout their visit. Vital Signs-Reviewed the patient's vital signs. Patient Vitals for the past 12 hrs:   Temp Pulse Resp BP SpO2   04/21/22 1739 98.6 °F (37 °C) 80 18 134/85 97 %         ED Course/ Provider Notes (Medical Decision Making):     Patient presented to the emergency department with a chief complaint of wheezing. On examination the patient is nontoxic and well-appearing. Vitals were reviewed per above. Occasional expiratory wheeze. Oxygen saturation 97% on room air. Patient given Solu-Medrol and DuoNeb with significant  Of symptoms. Chest x-ray shows no acute abnormality. Will prescribe 5-day course of prednisone. Information to follow-up with PCP. Kalli Mcconnell was given a thorough list of signs and symptoms that would warrant an immediate return to the emergency department. Otherwise Kalli Mcconnell will follow up with PCP.        Procedures   Medical Decision Makingedical Decision Making  Performed by: Niraj Riggs DO  Procedures  None Disposition   Disposition:     Home     All of the diagnostic tests were reviewed and questions answered. Diagnosis, care plan and treatment options were discussed. The patient understands the instructions and will follow up as directed. The patients results have been reviewed with them. They have been counseled regarding their diagnosis. The patient verbally convey understanding and agreement of the signs, symptoms, diagnosis, treatment and prognosis and additionally agrees to follow up as recommended with their PCP in 24 - 48 hours. They also agree with the care-plan and convey that all of their questions have been answered. I have also put together some discharge instructions for them that include: 1) educational information regarding their diagnosis, 2) how to care for their diagnosis at home, as well a 3) list of reasons why they would want to return to the ED prior to their follow-up appointment, should their condition change. DISCHARGE PLAN:    1. Current Discharge Medication List      CONTINUE these medications which have NOT CHANGED    Details   albuterol (PROVENTIL HFA, VENTOLIN HFA, PROAIR HFA) 90 mcg/actuation inhaler Take 2 Puffs by inhalation every four (4) hours as needed for Wheezing. Indications: asthma attack  Qty: 1 Each, Refills: 3    Associated Diagnoses: Moderate persistent asthma with acute exacerbation      DULoxetine (CYMBALTA) 60 mg capsule Take 1 Capsule by mouth daily. Qty: 30 Capsule, Refills: 5    Associated Diagnoses: Anxiety; Depression, unspecified depression type      traZODone (DESYREL) 100 mg tablet Take 1 Tablet by mouth nightly. Qty: 30 Tablet, Refills: 5    Associated Diagnoses: Anxiety; Depression, unspecified depression type      montelukast (Singulair) 10 mg tablet Take 1 Tablet by mouth daily. Qty: 30 Tablet, Refills: 5    Associated Diagnoses:  Moderate persistent asthma with acute exacerbation      loratadine (CLARITIN) 10 mg tablet Take 1 Tab by mouth daily. Qty: 30 Tab, Refills: 5    Associated Diagnoses: Moderate persistent asthma with acute exacerbation               2.   Follow-up Information    None           3. Return to ED if worse       4. Current Discharge Medication List            Diagnosis     Clinical Impression:    1. Mild intermittent asthma with exacerbation        Attestations:    Claudia Boudreaux, DO    Please note that this dictation was completed with [employer], the computer voice recognition software. Quite often unanticipated grammatical, syntax, homophones, and other interpretive errors are inadvertently transcribed by the computer software. Please disregard these errors. Please excuse any errors that have escaped final proofreading. Thank you.

## 2022-04-22 DIAGNOSIS — J45.41 MODERATE PERSISTENT ASTHMA WITH ACUTE EXACERBATION: ICD-10-CM

## 2022-04-22 RX ORDER — LORATADINE 10 MG/1
10 TABLET ORAL DAILY
Qty: 30 TABLET | Refills: 5 | Status: SHIPPED | OUTPATIENT
Start: 2022-04-22

## 2022-06-12 ENCOUNTER — APPOINTMENT (OUTPATIENT)
Dept: GENERAL RADIOLOGY | Age: 32
End: 2022-06-12
Attending: EMERGENCY MEDICINE
Payer: MEDICAID

## 2022-06-12 ENCOUNTER — HOSPITAL ENCOUNTER (EMERGENCY)
Age: 32
Discharge: HOME | End: 2022-06-12
Attending: EMERGENCY MEDICINE | Admitting: EMERGENCY MEDICINE
Payer: MEDICAID

## 2022-06-12 VITALS
OXYGEN SATURATION: 99 % | RESPIRATION RATE: 20 BRPM | HEART RATE: 76 BPM | DIASTOLIC BLOOD PRESSURE: 87 MMHG | TEMPERATURE: 98.4 F | HEIGHT: 59 IN | WEIGHT: 225 LBS | BODY MASS INDEX: 45.36 KG/M2 | SYSTOLIC BLOOD PRESSURE: 121 MMHG

## 2022-06-12 DIAGNOSIS — J45.21 MILD INTERMITTENT ASTHMA WITH ACUTE EXACERBATION: Primary | ICD-10-CM

## 2022-06-12 PROCEDURE — 99284 EMERGENCY DEPT VISIT MOD MDM: CPT

## 2022-06-12 PROCEDURE — 74011000250 HC RX REV CODE- 250: Performed by: EMERGENCY MEDICINE

## 2022-06-12 PROCEDURE — 74011250636 HC RX REV CODE- 250/636: Performed by: EMERGENCY MEDICINE

## 2022-06-12 PROCEDURE — 71045 X-RAY EXAM CHEST 1 VIEW: CPT

## 2022-06-12 PROCEDURE — 96372 THER/PROPH/DIAG INJ SC/IM: CPT

## 2022-06-12 RX ORDER — ALBUTEROL SULFATE 90 UG/1
1 AEROSOL, METERED RESPIRATORY (INHALATION)
Qty: 6.7 G | Refills: 1 | Status: SHIPPED | OUTPATIENT
Start: 2022-06-12

## 2022-06-12 RX ORDER — DEXAMETHASONE SODIUM PHOSPHATE 10 MG/ML
10 INJECTION INTRAMUSCULAR; INTRAVENOUS ONCE
Status: COMPLETED | OUTPATIENT
Start: 2022-06-12 | End: 2022-06-12

## 2022-06-12 RX ORDER — IPRATROPIUM BROMIDE AND ALBUTEROL SULFATE 2.5; .5 MG/3ML; MG/3ML
3 SOLUTION RESPIRATORY (INHALATION)
Qty: 30 NEBULE | Refills: 0 | Status: SHIPPED | OUTPATIENT
Start: 2022-06-12

## 2022-06-12 RX ORDER — IPRATROPIUM BROMIDE AND ALBUTEROL SULFATE 2.5; .5 MG/3ML; MG/3ML
3 SOLUTION RESPIRATORY (INHALATION)
Status: COMPLETED | OUTPATIENT
Start: 2022-06-12 | End: 2022-06-12

## 2022-06-12 RX ORDER — PREDNISONE 20 MG/1
TABLET ORAL
Qty: 12 TABLET | Refills: 0 | Status: SHIPPED | OUTPATIENT
Start: 2022-06-12

## 2022-06-12 RX ADMIN — DEXAMETHASONE SODIUM PHOSPHATE 10 MG: 10 INJECTION INTRAMUSCULAR; INTRAVENOUS at 21:09

## 2022-06-12 RX ADMIN — IPRATROPIUM BROMIDE AND ALBUTEROL SULFATE 3 ML: .5; 3 SOLUTION RESPIRATORY (INHALATION) at 21:09

## 2022-06-12 RX ADMIN — IPRATROPIUM BROMIDE AND ALBUTEROL SULFATE 3 ML: .5; 2.5 SOLUTION RESPIRATORY (INHALATION) at 21:36

## 2022-06-12 NOTE — Clinical Note
[facility] EMERGENCY DEPARTMENT  [address] [facility] [id number]  [phone]    Work/School Note    Date: 6/12/2022    To Whom It May concern:    Irina Eduardo was seen and treated today in the emergency room by the following provider(s):  Attending Provider: Doug Downey MD.      Irina Eduardo is excused from work/school on 6/12/2022 through 6/14/2022. She is medically clear to return to work/school on 6/15/2022.          Sincerely,          Stan Comer MD

## 2022-06-13 NOTE — ED PROVIDER NOTES
EMERGENCY DEPARTMENT HISTORY AND PHYSICAL EXAM      Date: 6/12/2022  Patient Name: Mynor Moe    History of Presenting Illness     Chief Complaint   Patient presents with    Wheezing       History Provided By: Patient    HPI: Mynor Moe, 33 y.o. female   presents to the ED with cc of wheezing. Patient with a history of asthma presents emergency room complaining of intermittent episode of wheezing for last several days. Patient states that ever since she was tested positive for COVID in January she has had more frequent episode of asthma attack. Patient used multiple nebulizer and inhaler for last several days. No fever chills. No nasal congestion sore throat. Patient has been compliant with Claritin and Singulair. No significant shortness of breath respiratory distress. No abdominal pain. No chest pain. PCP: None    No current facility-administered medications on file prior to encounter. Current Outpatient Medications on File Prior to Encounter   Medication Sig Dispense Refill    loratadine (CLARITIN) 10 mg tablet Take 1 Tablet by mouth daily. 30 Tablet 5    albuterol (PROVENTIL HFA, VENTOLIN HFA, PROAIR HFA) 90 mcg/actuation inhaler Take 2 Puffs by inhalation every four (4) hours as needed for Wheezing. Indications: asthma attack 1 Each 3    DULoxetine (CYMBALTA) 60 mg capsule Take 1 Capsule by mouth daily. 30 Capsule 5    traZODone (DESYREL) 100 mg tablet Take 1 Tablet by mouth nightly. 30 Tablet 5    montelukast (Singulair) 10 mg tablet Take 1 Tablet by mouth daily.  30 Tablet 5       Past History     Past Medical History:  Past Medical History:   Diagnosis Date    Anxiety     Asthma     Depression     Ill-defined condition     Headache    Nausea & vomiting        Past Surgical History:  Past Surgical History:   Procedure Laterality Date    HX GI      Choleycystectomy    HX GYN      3 C-sections    [place] ABDOMEN SURGERY PROC UNLISTED  2014    Ventral Hernia Repair       Family History:  Family History   Problem Relation Age of Onset    Depression Mother    [facility] Migraines Mother     Other Mother         fibromyalgia    Celiac Disease Mother     Bipolar Disorder Father        Social History:  Social History     Tobacco Use    Smoking status: Never Smoker    Smokeless tobacco: Never Used   Substance Use Topics    Alcohol use: No    Drug use: No       Allergies: Allergies   Allergen Reactions    Azithromycin Rash     Rash with IV infusion. Has taken tablets since without a reaction    Dilaudid [Hydromorphone] Swelling    Morphine Swelling         Review of Systems   Review of Systems   Constitutional: Negative for chills and fever. HENT: Negative for rhinorrhea and sore throat. Eyes: Negative for discharge. Respiratory: Positive for cough and wheezing. Cardiovascular: Negative for chest pain. Gastrointestinal: Negative for abdominal pain and vomiting. Genitourinary: Negative for dysuria. Musculoskeletal: Negative for joint swelling. Skin: Negative for rash. Neurological: Negative for headaches. Psychiatric/Behavioral: Negative for suicidal ideas. All other systems reviewed and are negative. Physical Exam   Physical Exam  Vitals and nursing note reviewed. Constitutional:       General: She is not in acute distress. Appearance: Normal appearance. She is not ill-appearing, toxic-appearing or diaphoretic. HENT:      Head: Normocephalic and atraumatic. Nose: Nose normal.      Mouth/Throat:      Mouth: Mucous membranes are moist.   Eyes:      Conjunctiva/sclera: Conjunctivae normal.   Cardiovascular:      Rate and Rhythm: Normal rate and regular rhythm. Heart sounds: Normal heart sounds. Pulmonary:      Effort: Pulmonary effort is normal.      Breath sounds: Wheezing and rhonchi present. Abdominal:      General: Abdomen is flat. Bowel sounds are normal.      Palpations: Abdomen is soft. Tenderness: There is no abdominal tenderness. There is no guarding or rebound. Musculoskeletal:      Cervical back: Neck supple. Right lower leg: No edema. Left lower leg: No edema. Skin:     General: Skin is warm and dry. Neurological:      General: No focal deficit present. Mental Status: She is alert and oriented to person, place, and time. Psychiatric:         Behavior: Behavior normal.         Thought Content: Thought content normal.         Diagnostic Study Results     Labs -   No results found for this or any previous visit (from the past 12 hour(s)). Radiologic Studies -   XR CHEST PORT   Final Result   No evidence of acute cardiopulmonary process. CT Results  (Last 48 hours)    None        CXR Results  (Last 48 hours)               06/12/22 2109  XR CHEST PORT Final result    Impression:  No evidence of acute cardiopulmonary process. Narrative:  Portable radiograph of the chest, one view       INDICATION: Wheezing       COMPARISON: 4/21/2022       FINDINGS:   Lungs are clear. Cardiomediastinal contour is within normal limits. There is no   pneumothorax or pleural effusion. No acute osseous abnormality. Medical Decision Making   I am the first provider for this patient. I reviewed the vital signs, available nursing notes, past medical history, past surgical history, family history and social history. Vital Signs-Reviewed the patient's vital signs. Patient Vitals for the past 12 hrs:   Temp Pulse Resp BP SpO2   06/12/22 2112 -- 76 20 121/87 99 %   06/12/22 2058 98.4 °F (36.9 °C) (!) 101 20 126/82 97 %       Records Reviewed:     Provider Notes (Medical Decision Making):       ED Course:   Initial assessment performed. The patients presenting problems have been discussed, and they are in agreement with the care plan formulated and outlined with them. I have encouraged them to ask questions as they arise throughout their visit. Improved.   Lung sounds with few scattered rhonchi and expiratory wheezes. No respiratory distress. Pulse oximetry on room air is 98%. PROCEDURES      Disposition: Condition stable   DC- Adult Discharges: All of the diagnostic tests were reviewed and questions answered. Diagnosis, care plan and treatment options were discussed. understand instructions and will follow up as directed. The patients results have been reviewed with them. They have been counseled regarding their diagnosis. The patient verbally convey understanding and agreement of the signs, symptoms, diagnosis, treatment and prognosis and additionally agrees to follow up as recommended. They also agree with the care-plan and convey that all of their questions have been answered. I have also put together some discharge instructions for them that include: 1) educational information regarding their diagnosis, 2) how to care for their diagnosis at home, as well a 3) list of reasons why they would want to return to the ED prior to their follow-up appointment, should their condition change. PLAN:  1. Current Discharge Medication List      START taking these medications    Details   !! albuterol (PROVENTIL HFA, VENTOLIN HFA, PROAIR HFA) 90 mcg/actuation inhaler Take 1 Puff by inhalation every four (4) hours as needed for Wheezing. Qty: 6.7 g, Refills: 1  Start date: 6/12/2022      albuterol-ipratropium (DUO-NEB) 2.5 mg-0.5 mg/3 ml nebu 3 mL by Nebulization route every six (6) hours as needed for Wheezing. Qty: 30 Nebule, Refills: 0  Start date: 6/12/2022      predniSONE (DELTASONE) 20 mg tablet 3 tablets for 2 days then 2 tablets for 2 days then 1 tablet for 2 day  Qty: 12 Tablet, Refills: 0  Start date: 6/12/2022       !! - Potential duplicate medications found. Please discuss with provider.       CONTINUE these medications which have NOT CHANGED    Details   !! albuterol (PROVENTIL HFA, VENTOLIN HFA, PROAIR HFA) 90 mcg/actuation inhaler Take 2 Puffs by inhalation every four (4) hours as needed for Wheezing. Indications: asthma attack  Qty: 1 Each, Refills: 3    Associated Diagnoses: Moderate persistent asthma with acute exacerbation       !! - Potential duplicate medications found. Please discuss with provider. 2.   Follow-up Information     Follow up With Specialties Details Why Contact Info    Walter Pires MD Pulmonary Disease   [address] [facility]  [phone]          Return to ED if worse     Diagnosis     Clinical Impression:   1. Mild intermittent asthma with acute exacerbation        Please note that this dictation was completed with [employer], the computer voice recognition software. Quite often unanticipated grammatical, syntax, homophones, and other interpretive errors are inadvertently transcribed by the computer software. Please disregard these errors. Please excuse any errors that have escaped final proofreading. Thank you.

## 2022-06-13 NOTE — ED TRIAGE NOTES
Pt  has been having problems with asthma for a couple months now.  has been using her rescue inhaler without relief.

## 2022-11-12 DIAGNOSIS — J45.41 MODERATE PERSISTENT ASTHMA WITH ACUTE EXACERBATION: ICD-10-CM

## 2022-11-12 RX ORDER — ALBUTEROL SULFATE 90 UG/1
AEROSOL, METERED RESPIRATORY (INHALATION)
Qty: 18 G | Refills: 1 | Status: SHIPPED | OUTPATIENT
Start: 2022-11-12

## 2023-01-12 ENCOUNTER — APPOINTMENT (RX ONLY)
Dept: URBAN - METROPOLITAN AREA CLINIC 122 | Facility: CLINIC | Age: 33
Setting detail: DERMATOLOGY
End: 2023-01-12

## 2023-01-12 DIAGNOSIS — L91.8 OTHER HYPERTROPHIC DISORDERS OF THE SKIN: ICD-10-CM

## 2023-01-12 DIAGNOSIS — D22 MELANOCYTIC NEVI: ICD-10-CM

## 2023-01-12 DIAGNOSIS — Z87.2 PERSONAL HISTORY OF DISEASES OF THE SKIN AND SUBCUTANEOUS TISSUE: ICD-10-CM

## 2023-01-12 DIAGNOSIS — L01.01 NON-BULLOUS IMPETIGO: ICD-10-CM

## 2023-01-12 DIAGNOSIS — L82.1 OTHER SEBORRHEIC KERATOSIS: ICD-10-CM

## 2023-01-12 DIAGNOSIS — Z71.89 OTHER SPECIFIED COUNSELING: ICD-10-CM

## 2023-01-12 PROBLEM — L08.9 LOCAL INFECTION OF THE SKIN AND SUBCUTANEOUS TISSUE, UNSPECIFIED: Status: ACTIVE | Noted: 2023-01-12

## 2023-01-12 PROBLEM — D22.5 MELANOCYTIC NEVI OF TRUNK: Status: ACTIVE | Noted: 2023-01-12

## 2023-01-12 PROCEDURE — 99203 OFFICE O/P NEW LOW 30 MIN: CPT

## 2023-01-12 PROCEDURE — ? COUNSELING

## 2023-01-12 PROCEDURE — ? PRESCRIPTION

## 2023-01-12 RX ORDER — MUPIROCIN 20 MG/G
OINTMENT TOPICAL
Qty: 22 | Refills: 2 | Status: ERX | COMMUNITY
Start: 2023-01-12 | End: 2023-01-19

## 2023-01-12 RX ADMIN — MUPIROCIN: 20 OINTMENT TOPICAL at 00:00

## 2023-01-12 ASSESSMENT — LOCATION DETAILED DESCRIPTION DERM
LOCATION DETAILED: PERIUMBILICAL SKIN
LOCATION DETAILED: EPIGASTRIC SKIN
LOCATION DETAILED: LEFT RIB CAGE
LOCATION DETAILED: LEFT ANTERIOR PROXIMAL THIGH

## 2023-01-12 ASSESSMENT — LOCATION SIMPLE DESCRIPTION DERM
LOCATION SIMPLE: LEFT THIGH
LOCATION SIMPLE: ABDOMEN

## 2023-01-12 ASSESSMENT — LOCATION ZONE DERM
LOCATION ZONE: TRUNK
LOCATION ZONE: LEG

## 2023-01-12 NOTE — HPI: SKIN LESION
What Type Of Note Output Would You Prefer (Optional)?: Standard Output
treated_been_treated
Is This A New Presentation, Or A Follow-Up?: Skin Lesions
Which Family Member (Optional)?: Aunt- mat
When Was It Treated?: 10/26/22
Additional History: Previous excision of DN to left lateral thigh central, scar is discolored. Patient also has pink spot on her left upper abdomen x 2 weeks, no recent treatment. Patient currently 26 weeks pregnant.

## 2023-01-17 DIAGNOSIS — F32.A DEPRESSION, UNSPECIFIED DEPRESSION TYPE: ICD-10-CM

## 2023-01-17 DIAGNOSIS — J45.41 MODERATE PERSISTENT ASTHMA WITH ACUTE EXACERBATION: ICD-10-CM

## 2023-01-17 DIAGNOSIS — F41.9 ANXIETY: ICD-10-CM

## 2023-01-17 RX ORDER — MONTELUKAST SODIUM 10 MG/1
10 TABLET ORAL DAILY
Qty: 30 TABLET | Refills: 5 | Status: SHIPPED | OUTPATIENT
Start: 2023-01-17

## 2023-01-17 RX ORDER — DULOXETIN HYDROCHLORIDE 60 MG/1
60 CAPSULE, DELAYED RELEASE ORAL DAILY
Qty: 30 CAPSULE | Refills: 5 | Status: SHIPPED | OUTPATIENT
Start: 2023-01-17

## 2023-01-26 ENCOUNTER — DOCUMENTATION ONLY (OUTPATIENT)
Dept: FAMILY MEDICINE CLINIC | Age: 33
End: 2023-01-26

## 2023-01-26 NOTE — PROGRESS NOTES
Called pt in regards to her mychart apt request for Anxiety, depression, weight. LVM to call us back, was going to offer next available with Dustin Shrutijonathanfrancisco and see if she wanted ov or vv.

## 2023-02-15 ENCOUNTER — VIRTUAL VISIT (OUTPATIENT)
Dept: FAMILY MEDICINE CLINIC | Age: 33
End: 2023-02-15
Payer: MEDICAID

## 2023-02-15 DIAGNOSIS — J45.41 MODERATE PERSISTENT ASTHMA WITH ACUTE EXACERBATION: Primary | ICD-10-CM

## 2023-02-15 DIAGNOSIS — F32.A DEPRESSION, UNSPECIFIED DEPRESSION TYPE: ICD-10-CM

## 2023-02-15 PROCEDURE — 99214 OFFICE O/P EST MOD 30 MIN: CPT | Performed by: NURSE PRACTITIONER

## 2023-02-15 RX ORDER — ALBUTEROL SULFATE 90 UG/1
AEROSOL, METERED RESPIRATORY (INHALATION)
Qty: 18 G | Refills: 1 | Status: SHIPPED | OUTPATIENT
Start: 2023-02-15

## 2023-02-15 RX ORDER — BUPROPION HYDROCHLORIDE 150 MG/1
150 TABLET ORAL DAILY
Qty: 30 TABLET | Refills: 5 | Status: SHIPPED | OUTPATIENT
Start: 2023-02-15

## 2023-02-15 RX ORDER — FLUTICASONE PROPIONATE AND SALMETEROL 250; 50 UG/1; UG/1
1 POWDER RESPIRATORY (INHALATION) EVERY 12 HOURS
Qty: 60 EACH | Refills: 5 | Status: SHIPPED | OUTPATIENT
Start: 2023-02-15 | End: 2023-02-21

## 2023-02-15 NOTE — PROGRESS NOTES
Chief Complaint   Patient presents with    Follow-up    Anxiety    Depression     Pt being seen to discuss mental brijesh   -pt states she recently saw a decline in her mental health  -pt states that her kids father was murdered     3. Have you been to the ER, urgent care clinic since your last visit? Hospitalized since your last visit? yes    2. Have you seen or consulted any other health care providers outside of the [address] [place] [address] since your last visit? Include any pap smears or colon screening.  No    Pt has no other concerns

## 2023-02-16 DIAGNOSIS — F41.9 ANXIETY: ICD-10-CM

## 2023-02-16 DIAGNOSIS — F32.A DEPRESSION, UNSPECIFIED DEPRESSION TYPE: ICD-10-CM

## 2023-02-16 RX ORDER — TRAZODONE HYDROCHLORIDE 100 MG/1
100 TABLET ORAL
Qty: 30 TABLET | Refills: 5 | Status: SHIPPED | OUTPATIENT
Start: 2023-02-16

## 2023-02-20 DIAGNOSIS — J45.41 MODERATE PERSISTENT ASTHMA WITH ACUTE EXACERBATION: ICD-10-CM

## 2023-02-20 RX ORDER — LORATADINE 10 MG/1
TABLET ORAL
Qty: 30 TABLET | Refills: 5 | Status: SHIPPED | OUTPATIENT
Start: 2023-02-20

## 2023-02-21 RX ORDER — FLUTICASONE PROPIONATE AND SALMETEROL 250; 50 UG/1; UG/1
1 POWDER RESPIRATORY (INHALATION) 2 TIMES DAILY
Qty: 60 EACH | Refills: 1 | Status: SHIPPED | OUTPATIENT
Start: 2023-02-21

## 2023-02-21 NOTE — PROGRESS NOTES
Cristofer Beaver (: 1990) is a 33 y.o. female, established patient, here for evaluation of the following chief complaint(s):   Follow-up, Anxiety, and Depression       ASSESSMENT/PLAN:  Below is the assessment and plan developed based on review of pertinent history, labs, studies, and medications. Diagnoses and all orders for this visit:    1. Moderate persistent asthma with acute exacerbation  -     albuterol (PROVENTIL HFA, VENTOLIN HFA, PROAIR HFA) 90 mcg/actuation inhaler; TAKE 2 PUFFS BY MOUTH EVERY 4 HOURS AS NEEDED WHEEZING AND SHORTNESS OF BREATH    2. Depression, unspecified depression type    Other orders  -     fluticasone propion-salmeteroL (Wixela Inhub) 250-50 mcg/dose diskus inhaler; Take 1 Puff by inhalation every twelve (12) hours. -     buPROPion XL (WELLBUTRIN XL) 150 mg tablet; Take 1 Tablet by mouth daily.     Start wellbutrin for mood, add to the meds she is already on  Must start counseling as well  Follow up 3 weeks  Also given wixella for asthma sx for the season and refilled albuterol  Reviewed breathing triggers      SUBJECTIVE/OBJECTIVE:  HPI  She has been overwhelmed and depressed  The father of her 3 kids was murdered last year and not coping well  Had to go to work because she no longer had money to support herself and the kids  Everyone in the house is depressed, can't do for her self and the kids  Works for [employer] and does have benefits for counseling  Meds she is on now aren't helping    Review of Systems   A comprehensive review of system was obtained and negative except findings in the HPI    No data recorded     Physical Exam    [INSTRUCTIONS:  \"[x]\" Indicates a positive item  \"[]\" Indicates a negative item  -- DELETE ALL ITEMS NOT EXAMINED]    Constitutional: [x] Appears well-developed and well-nourished [x] No apparent distress      [] Abnormal -     Mental status: [x] Alert and awake  [x] Oriented to person/place/time [x] Able to follow commands    [] Abnormal - Eyes:   EOM    [x]  Normal    [] Abnormal -   Sclera  [x]  Normal    [] Abnormal -          Discharge [x]  None visible   [] Abnormal -     HENT: [x] Normocephalic, atraumatic  [] Abnormal -   [x] Mouth/Throat: Mucous membranes are moist    External Ears [x] Normal  [] Abnormal -    Neck: [x] No visualized mass [] Abnormal -     Pulmonary/Chest: [x] Respiratory effort normal   [x] No visualized signs of difficulty breathing or respiratory distress        [] Abnormal -      Musculoskeletal:   [x] Normal gait with no signs of ataxia         [x] Normal range of motion of neck        [] Abnormal -     Neurological:        [x] No Facial Asymmetry (Cranial nerve 7 motor function) (limited exam due to video visit)          [x] No gaze palsy        [] Abnormal -          Skin:        [x] No significant exanthematous lesions or discoloration noted on facial skin         [] Abnormal -            Psychiatric:       [x] Normal Affect [] Abnormal -        [x] No Hallucinations    Other pertinent observable physical exam findings:-    On this date 02/15/2023 I have spent 20 minutes reviewing previous notes, test results and face to face (virtual) with the patient discussing the diagnosis and importance of compliance with the treatment plan as well as documenting on the day of the visitAdalid Ford, was evaluated through a synchronous (real-time) audio-video encounter. The patient (or guardian if applicable) is aware that this is a billable service, which includes applicable co-pays. This Virtual Visit was conducted with patient's (and/or legal guardian's) consent. The visit was conducted pursuant to the emergency declaration under the [address] [place] [address] authority and the [employer] and [employer] Act. Patient identification was verified, and a caregiver was present when appropriate.   The patient was located at: Home: [address] [place] [address]  [address] [facility] [address]  The provider was located at: [facility]       An electronic signature was used to authenticate this note.   -- Del Medeiros, NP

## 2023-02-24 ENCOUNTER — DOCUMENTATION ONLY (OUTPATIENT)
Dept: FAMILY MEDICINE CLINIC | Age: 33
End: 2023-02-24

## 2023-02-25 ENCOUNTER — HOSPITAL ENCOUNTER (EMERGENCY)
Age: 33
Discharge: HOME | End: 2023-02-25
Attending: EMERGENCY MEDICINE
Payer: MEDICAID

## 2023-02-25 VITALS
OXYGEN SATURATION: 96 % | WEIGHT: 225 LBS | RESPIRATION RATE: 22 BRPM | BODY MASS INDEX: 44.17 KG/M2 | DIASTOLIC BLOOD PRESSURE: 68 MMHG | HEIGHT: 60 IN | HEART RATE: 98 BPM | SYSTOLIC BLOOD PRESSURE: 121 MMHG | TEMPERATURE: 98.2 F

## 2023-02-25 DIAGNOSIS — J45.21 MILD INTERMITTENT ASTHMA WITH ACUTE EXACERBATION: Primary | ICD-10-CM

## 2023-02-25 PROCEDURE — 77030029684 HC NEB SM VOL KT MONA -A

## 2023-02-25 PROCEDURE — 74011000250 HC RX REV CODE- 250: Performed by: EMERGENCY MEDICINE

## 2023-02-25 PROCEDURE — 96361 HYDRATE IV INFUSION ADD-ON: CPT

## 2023-02-25 PROCEDURE — 74011000250 HC RX REV CODE- 250

## 2023-02-25 PROCEDURE — 74011250636 HC RX REV CODE- 250/636: Performed by: EMERGENCY MEDICINE

## 2023-02-25 PROCEDURE — 94640 AIRWAY INHALATION TREATMENT: CPT

## 2023-02-25 PROCEDURE — 99284 EMERGENCY DEPT VISIT MOD MDM: CPT

## 2023-02-25 PROCEDURE — 94664 DEMO&/EVAL PT USE INHALER: CPT

## 2023-02-25 PROCEDURE — 96375 TX/PRO/DX INJ NEW DRUG ADDON: CPT

## 2023-02-25 PROCEDURE — 96365 THER/PROPH/DIAG IV INF INIT: CPT

## 2023-02-25 RX ORDER — DEXAMETHASONE SODIUM PHOSPHATE 100 MG/10ML
10 INJECTION INTRAMUSCULAR; INTRAVENOUS
Status: DISCONTINUED | OUTPATIENT
Start: 2023-02-25 | End: 2023-02-25 | Stop reason: CLARIF

## 2023-02-25 RX ORDER — DEXAMETHASONE SODIUM PHOSPHATE 10 MG/ML
10 INJECTION INTRAMUSCULAR; INTRAVENOUS
Status: COMPLETED | OUTPATIENT
Start: 2023-02-25 | End: 2023-02-25

## 2023-02-25 RX ORDER — ALBUTEROL SULFATE 0.83 MG/ML
SOLUTION RESPIRATORY (INHALATION)
Status: COMPLETED
Start: 2023-02-25 | End: 2023-02-25

## 2023-02-25 RX ORDER — IPRATROPIUM BROMIDE AND ALBUTEROL SULFATE 2.5; .5 MG/3ML; MG/3ML
3 SOLUTION RESPIRATORY (INHALATION)
Qty: 30 EACH | Refills: 0 | Status: SHIPPED | OUTPATIENT
Start: 2023-02-25

## 2023-02-25 RX ORDER — ALBUTEROL SULFATE 0.83 MG/ML
5 SOLUTION RESPIRATORY (INHALATION)
Status: COMPLETED | OUTPATIENT
Start: 2023-02-25 | End: 2023-02-25

## 2023-02-25 RX ORDER — PREDNISONE 20 MG/1
60 TABLET ORAL DAILY
Qty: 15 TABLET | Refills: 0 | Status: SHIPPED | OUTPATIENT
Start: 2023-02-25 | End: 2023-03-02

## 2023-02-25 RX ORDER — ALBUTEROL SULFATE 90 UG/1
2 AEROSOL, METERED RESPIRATORY (INHALATION)
Qty: 2 EACH | Refills: 2 | Status: SHIPPED | OUTPATIENT
Start: 2023-02-25

## 2023-02-25 RX ORDER — MAGNESIUM SULFATE 1 G/100ML
1 INJECTION INTRAVENOUS
Status: COMPLETED | OUTPATIENT
Start: 2023-02-25 | End: 2023-02-25

## 2023-02-25 RX ORDER — IPRATROPIUM BROMIDE AND ALBUTEROL SULFATE 2.5; .5 MG/3ML; MG/3ML
9 SOLUTION RESPIRATORY (INHALATION)
Status: COMPLETED | OUTPATIENT
Start: 2023-02-25 | End: 2023-02-25

## 2023-02-25 RX ADMIN — DEXAMETHASONE SODIUM PHOSPHATE 10 MG: 10 INJECTION INTRAMUSCULAR; INTRAVENOUS at 05:18

## 2023-02-25 RX ADMIN — SODIUM CHLORIDE 1000 ML: 9 INJECTION, SOLUTION INTRAVENOUS at 05:13

## 2023-02-25 RX ADMIN — MAGNESIUM SULFATE HEPTAHYDRATE 1 G: 1 INJECTION, SOLUTION INTRAVENOUS at 05:13

## 2023-02-25 RX ADMIN — ALBUTEROL SULFATE 5 MG: 0.83 SOLUTION RESPIRATORY (INHALATION) at 05:43

## 2023-02-25 RX ADMIN — IPRATROPIUM BROMIDE AND ALBUTEROL SULFATE 9 ML: 2.5; .5 SOLUTION RESPIRATORY (INHALATION) at 05:07

## 2023-02-25 RX ADMIN — ALBUTEROL SULFATE 5 MG: 2.5 SOLUTION RESPIRATORY (INHALATION) at 05:43

## 2023-02-25 NOTE — ED NOTES
Pt here with c/o s.o.b. r/t asthma speaking short sentences wheezing noted pt sitting at bedside with family

## 2023-02-25 NOTE — ED PROVIDER NOTES
[facility] EMERGENCY DEPT  EMERGENCY DEPARTMENT ENCOUNTER       Pt Name: Taylor Sánchez  MRN: [id number]  [place] 1990  Date of evaluation: 2/25/2023  Provider: Quinton Redman MD   PCP: Randy Stallings NP  Note Started: 4:58 AM 2/25/23     CHIEF COMPLAINT       Chief Complaint   Patient presents with    Shortness of Breath     X1 hour, hx asthma        HISTORY OF PRESENT ILLNESS: 1 or more elements      History From: patient and patient's girlfriend, History limited by:  none     Taylor Sánchez is a 29 y.o. female who presents [facility] to the ED complaining of shortness of breath, wheeze, chest tightness typical of her asthma exacerbations. Patient was in her usual state of health yesterday, her birthday. She went out last night for her birthday and was around people smoking Black and milds which triggered the symptoms. Denies recent illness. Patient states that she has been in and out of the hospital every month for her asthma at [address].  States she is out of some of her asthma medication. Has a prescription to  at [facility] but was having insurance issues. Tried using her asthma inhaler and nebulizer without significant relief. States she does not feel as bad as previous episodes when she has had to be hospitalized. Nursing Notes were all reviewed and agreed with or any disagreements were addressed in the HPI. REVIEW OF SYSTEMS        Positives and Pertinent negatives as per HPI.     PAST HISTORY     Past Medical History:  Past Medical History:   Diagnosis Date    Anxiety     Asthma     Depression     Ill-defined condition     Headache    Nausea & vomiting        Past Surgical History:  Past Surgical History:   Procedure Laterality Date    HX GI      Choleycystectomy    HX GYN      3 C-sections    MA ABDOMEN SURGERY PROC UNLISTED  2014    Ventral Hernia Repair       Family History:  Family History   Problem Relation Age of Onset    Depression Mother     Migraines Mother Other Mother         fibromyalgia    Celiac Disease Mother     Bipolar Disorder Father        Social History:  Social History     Tobacco Use    Smoking status: Never    Smokeless tobacco: Never   Substance Use Topics    Alcohol use: No    Drug use: No       Allergies: Allergies   Allergen Reactions    Azithromycin Rash     Rash with IV infusion. Has taken tablets since without a reaction    Dilaudid [Hydromorphone] Swelling    Morphine Swelling       CURRENT MEDICATIONS      Previous Medications    ALBUTEROL (PROVENTIL HFA, VENTOLIN HFA, PROAIR HFA) 90 MCG/ACTUATION INHALER    Take 1 Puff by inhalation every four (4) hours as needed for Wheezing. ALBUTEROL (PROVENTIL HFA, VENTOLIN HFA, PROAIR HFA) 90 MCG/ACTUATION INHALER    TAKE 2 PUFFS BY MOUTH EVERY 4 HOURS AS NEEDED WHEEZING AND SHORTNESS OF BREATH    ALBUTEROL-IPRATROPIUM (DUO-NEB) 2.5 MG-0.5 MG/3 ML NEBU    3 mL by Nebulization route every six (6) hours as needed for Wheezing. BUPROPION XL (WELLBUTRIN XL) 150 MG TABLET    Take 1 Tablet by mouth daily. DULOXETINE (CYMBALTA) 60 MG CAPSULE    Take 1 Capsule by mouth daily. FLUTICASONE PROPION-SALMETEROL (ADVAIR/WIXELA) 250-50 MCG/DOSE DISKUS INHALER    Take 1 Puff by inhalation two (2) times a day. LORATADINE (CLARITIN) 10 MG TABLET    TAKE 1 TABLET BY MOUTH EVERY DAY    MONTELUKAST (SINGULAIR) 10 MG TABLET    Take 1 Tablet by mouth daily. TRAZODONE (DESYREL) 100 MG TABLET    Take 1 Tablet by mouth nightly. SCREENINGS               No data recorded         PHYSICAL EXAM      ED Triage Vitals [02/25/23 0450]   ED Encounter Vitals Group      /78      Pulse (Heart Rate) (!) 106      Resp Rate 22      Temp 98.2 °F (36.8 °C)      Temp src       O2 Sat (%) 98 %      Weight 225 lb      Height 5'        Physical Exam  Constitutional:       Appearance: She is obese.    Pulmonary:      Comments: Patient with mild accessory muscle use, prolonged expiratory phase, bilateral expiratory wheeze. DIAGNOSTIC RESULTS   LABS:     No results found for this or any previous visit (from the past 12 hour(s)). EKG: If performed, independent interpretation documented below in the MDM section     RADIOLOGY:  Non-plain film images such as CT, Ultrasound and MRI are read by the radiologist. Plain radiographic images are visualized and preliminarily interpreted by the ED Provider with the findings documented in the MDM section. Interpretation per the Radiologist below, if available at the time of this note:     No results found. PROCEDURES   Unless otherwise noted below, none  Procedures       EMERGENCY DEPARTMENT COURSE and DIFFERENTIAL DIAGNOSIS/MDM   Vitals:    Vitals:    02/25/23 0450   BP: 127/78   Pulse: (!) 106   Resp: 22   Temp: 98.2 °F (36.8 °C)   SpO2: 98%   Weight: 102.1 kg (225 lb)   Height: 5' (1.524 m)        Patient was given the following medications:  Medications   sodium chloride 0.9 % bolus infusion 1,000 mL (has no administration in time range)   dexamethasone (DECADRON) 10 mg/mL injection 10 mg (has no administration in time range)   magnesium sulfate 1 g/100 ml IVPB (premix or compounded) (has no administration in time range)   albuterol-ipratropium (DUO-NEB) 2.5 MG-0.5 MG/3 ML (has no administration in time range)       Medical Decision Making  Asthma exacerbation likely triggered by environmental allergen (cigarette smoke). Given history of multiple admissions we will give IV fluid, mag as well as steroids and nebs then reeval.  No historical or exam elements suggestive of infection. Risk  Prescription drug management. **PLEASE SEE ED COURSE DETAILS BELOW FOR FURTHER MDM DETAILS:         FINAL IMPRESSION   No diagnosis found. DISPOSITION/PLAN   Nisha Tian  results have been reviewed with her. She has been counseled regarding her diagnosis, treatment, and plan.   She verbally conveys understanding and agreement of the signs, symptoms, diagnosis, treatment and prognosis and additionally agrees to follow up as discussed. She also agrees with the care-plan and conveys that all of her questions have been answered. I have also provided discharge instructions for her that include: educational information regarding their diagnosis and treatment, and list of reasons why they would want to return to the ED prior to their follow-up appointment, should her condition change. PATIENT REFERRED TO:  Follow-up Information    None           DISCHARGE MEDICATIONS:  Current Discharge Medication List            DISCONTINUED MEDICATIONS:  Current Discharge Medication List          I am the Primary Clinician of Record. Kimmy Lindsay MD (electronically signed)    (Please note that parts of this dictation were completed with voice recognition software. Quite often unanticipated grammatical, syntax, homophones, and other interpretive errors are inadvertently transcribed by the computer software. Please disregards these errors.  Please excuse any errors that have escaped final proofreading.) have been answered. I have also provided discharge instructions for her that include: educational information regarding their diagnosis and treatment, and list of reasons why they would want to return to the ED prior to their follow-up appointment, should her condition change. PATIENT REFERRED TO:  Follow-up Information       Follow up With Specialties Details Why Contact Info    Lloyd Bejarano NP Family Medicine  As needed, If symptoms worsen [id number] [address]  [address] [address]  [phone]                DISCHARGE MEDICATIONS:  Discharge Medication List as of 2/25/2023  6:48 AM        START taking these medications    Details   predniSONE (DELTASONE) 20 mg tablet Take 3 Tablets by mouth daily for 5 days. , Normal, Disp-15 Tablet, R-0      !! albuterol (PROVENTIL HFA, VENTOLIN HFA, PROAIR HFA) 90 mcg/actuation inhaler Take 2 Puffs by inhalation every four (4) hours as needed for Wheezing., Normal, Disp-2 Each, R-2      !! albuterol-ipratropium (DUO-NEB) 2.5 mg-0.5 mg/3 ml nebu 3 mL by Nebulization route every four (4) hours as needed for Wheezing., Normal, Disp-30 Each, R-0       !! - Potential duplicate medications found. Please discuss with provider. CONTINUE these medications which have NOT CHANGED    Details   fluticasone propion-salmeteroL (ADVAIR/WIXELA) 250-50 mcg/dose diskus inhaler Take 1 Puff by inhalation two (2) times a day., Normal, Disp-60 Each, R-1      loratadine (CLARITIN) 10 mg tablet TAKE 1 TABLET BY MOUTH EVERY DAY, Normal, Disp-30 Tablet, R-5      traZODone (DESYREL) 100 mg tablet Take 1 Tablet by mouth nightly., Normal, Disp-30 Tablet, R-5      !! albuterol (PROVENTIL HFA, VENTOLIN HFA, PROAIR HFA) 90 mcg/actuation inhaler TAKE 2 PUFFS BY MOUTH EVERY 4 HOURS AS NEEDED WHEEZING AND SHORTNESS OF BREATH, Normal, Disp-18 g, R-1      buPROPion XL (WELLBUTRIN XL) 150 mg tablet Take 1 Tablet by mouth daily. , Normal, Disp-30 Tablet, R-5      montelukast (Singulair) 10 mg tablet Take 1 Tablet by mouth daily. , Normal, Disp-30 Tablet, R-5      DULoxetine (CYMBALTA) 60 mg capsule Take 1 Capsule by mouth daily. , Normal, Disp-30 Capsule, R-5      !! albuterol (PROVENTIL HFA, VENTOLIN HFA, PROAIR HFA) 90 mcg/actuation inhaler Take 1 Puff by inhalation every four (4) hours as needed for Wheezing., Normal, Disp-6.7 g, R-1      !! albuterol-ipratropium (DUO-NEB) 2.5 mg-0.5 mg/3 ml nebu 3 mL by Nebulization route every six (6) hours as needed for Wheezing., Normal, Disp-30 Nebule, R-0       !! - Potential duplicate medications found. Please discuss with provider. DISCONTINUED MEDICATIONS:  Discharge Medication List as of 2/25/2023  6:48 AM          I am the Primary Clinician of Record. Inocencio New MD (electronically signed)    (Please note that parts of this dictation were completed with voice recognition software. Quite often unanticipated grammatical, syntax, homophones, and other interpretive errors are inadvertently transcribed by the computer software. Please disregards these errors.  Please excuse any errors that have escaped final proofreading.)

## 2023-02-25 NOTE — ED TRIAGE NOTES
Pt presents to ED with c/o SOB, wheezing x1 hour. Pt reports hx asthma. Pt reports using inhaler and neb with no relief.

## 2023-03-06 ENCOUNTER — DOCUMENTATION ONLY (OUTPATIENT)
Dept: FAMILY MEDICINE CLINIC | Age: 33
End: 2023-03-06

## 2023-03-14 ENCOUNTER — HOSPITAL ENCOUNTER (EMERGENCY)
Age: 33
Discharge: HOME | End: 2023-03-15
Attending: EMERGENCY MEDICINE
Payer: COMMERCIAL

## 2023-03-14 DIAGNOSIS — J45.901 SEVERE ASTHMA WITH ACUTE EXACERBATION, UNSPECIFIED WHETHER PERSISTENT: Primary | ICD-10-CM

## 2023-03-14 PROCEDURE — 96365 THER/PROPH/DIAG IV INF INIT: CPT

## 2023-03-14 PROCEDURE — 99284 EMERGENCY DEPT VISIT MOD MDM: CPT

## 2023-03-14 PROCEDURE — 96374 THER/PROPH/DIAG INJ IV PUSH: CPT

## 2023-03-14 PROCEDURE — 74011000250 HC RX REV CODE- 250: Performed by: EMERGENCY MEDICINE

## 2023-03-14 PROCEDURE — 74011250636 HC RX REV CODE- 250/636: Performed by: EMERGENCY MEDICINE

## 2023-03-14 PROCEDURE — 94664 DEMO&/EVAL PT USE INHALER: CPT

## 2023-03-14 PROCEDURE — 93005 ELECTROCARDIOGRAM TRACING: CPT

## 2023-03-14 RX ORDER — IPRATROPIUM BROMIDE AND ALBUTEROL SULFATE 2.5; .5 MG/3ML; MG/3ML
9 SOLUTION RESPIRATORY (INHALATION)
Status: COMPLETED | OUTPATIENT
Start: 2023-03-14 | End: 2023-03-14

## 2023-03-14 RX ORDER — MAGNESIUM SULFATE 1 G/100ML
1 INJECTION INTRAVENOUS ONCE
Status: COMPLETED | OUTPATIENT
Start: 2023-03-14 | End: 2023-03-15

## 2023-03-14 RX ORDER — IPRATROPIUM BROMIDE AND ALBUTEROL SULFATE 2.5; .5 MG/3ML; MG/3ML
3 SOLUTION RESPIRATORY (INHALATION)
Status: DISCONTINUED | OUTPATIENT
Start: 2023-03-14 | End: 2023-03-14

## 2023-03-14 RX ADMIN — IPRATROPIUM BROMIDE AND ALBUTEROL SULFATE 9 ML: .5; 3 SOLUTION RESPIRATORY (INHALATION) at 22:37

## 2023-03-14 RX ADMIN — METHYLPREDNISOLONE SODIUM SUCCINATE 125 MG: 125 INJECTION, POWDER, FOR SOLUTION INTRAMUSCULAR; INTRAVENOUS at 22:57

## 2023-03-14 RX ADMIN — MAGNESIUM SULFATE HEPTAHYDRATE 1 G: 1 INJECTION, SOLUTION INTRAVENOUS at 23:12

## 2023-03-15 VITALS
BODY MASS INDEX: 44.35 KG/M2 | SYSTOLIC BLOOD PRESSURE: 130 MMHG | WEIGHT: 220 LBS | OXYGEN SATURATION: 97 % | TEMPERATURE: 98.6 F | HEART RATE: 95 BPM | HEIGHT: 59 IN | DIASTOLIC BLOOD PRESSURE: 59 MMHG | RESPIRATION RATE: 21 BRPM

## 2023-03-15 LAB
ATRIAL RATE: 118 BPM
CALCULATED P AXIS, ECG09: 57 DEGREES
CALCULATED R AXIS, ECG10: 32 DEGREES
CALCULATED T AXIS, ECG11: 27 DEGREES
DIAGNOSIS, 93000: NORMAL
P-R INTERVAL, ECG05: 138 MS
Q-T INTERVAL, ECG07: 328 MS
QRS DURATION, ECG06: 78 MS
QTC CALCULATION (BEZET), ECG08: 459 MS
VENTRICULAR RATE, ECG03: 118 BPM

## 2023-03-15 RX ORDER — PREDNISONE 10 MG/1
TABLET ORAL
Qty: 48 TABLET | Refills: 0 | Status: SHIPPED | OUTPATIENT
Start: 2023-03-15

## 2023-03-15 NOTE — ED PROVIDER NOTES
Former[facility] EMERGENCY DEPT  EMERGENCY DEPARTMENT ENCOUNTER       Pt Name: Lily Dejesus  MRN: [id number]  [place] 1990  Date of evaluation: 3/14/2023  Provider: Lynn Navas MD   PCP: Lashawn Glynn NP  Note Started: 10:37 PM 3/14/23     CHIEF COMPLAINT       Chief Complaint   Patient presents with    Asthma        HISTORY OF PRESENT ILLNESS: 1 or more elements      History From: Patient  HPI Limitations : None     Lily Dejesus is a 29 y.o. female who presents with chest tightness, wheezing, sob. Symptoms typical of prior asthma exacerbation but this evening more severe than normal and not helped with home albuterol. No fever, chills, cough. No pleuritic chest pain. Sob started this morning, unclear trigger, she thinks it may be change in weather. Initially her home neb treatment helped with sob but this evening it no longer was effective and sob continued to progress. Please see more comprehensive history below under MDM  Nursing Notes were all reviewed in real time as they are made available. Any disagreements addressed in the HPI/MDM. REVIEW OF SYSTEMS      Review of Systems   Constitutional:  Negative for fever. Respiratory:  Positive for chest tightness, shortness of breath and wheezing. Negative for cough and stridor. Cardiovascular:  Negative for chest pain, palpitations and leg swelling. Gastrointestinal:  Negative for abdominal pain, diarrhea, nausea and vomiting. Genitourinary:  Negative for dysuria, flank pain and hematuria. Skin:  Negative for rash. Psychiatric/Behavioral:  Negative for confusion. Positives and Pertinent negatives as per HPI and MDM.     PAST HISTORY     Past Medical History:  Past Medical History:   Diagnosis Date    Anxiety     Asthma     Depression     Ill-defined condition     Headache    Nausea & vomiting        Past Surgical History:  Past Surgical History:   Procedure Laterality Date    HX GI      Choleycystectomy    HX GYN      3 C-sections    [place] ABDOMEN SURGERY PROC UNLISTED  2014    Ventral Hernia Repair       Family History:  Family History   Problem Relation Age of Onset    Depression Mother     Migraines Mother     Other Mother         fibromyalgia    Celiac Disease Mother     Bipolar Disorder Father        Social History:  Social History     Tobacco Use    Smoking status: Never    Smokeless tobacco: Never   Substance Use Topics    Alcohol use: No    Drug use: No       Allergies: Allergies   Allergen Reactions    Azithromycin Rash     Rash with IV infusion. Has taken tablets since without a reaction    Dilaudid [Hydromorphone] Swelling    Morphine Swelling       CURRENT MEDICATIONS      Previous Medications    ALBUTEROL (PROVENTIL HFA, VENTOLIN HFA, PROAIR HFA) 90 MCG/ACTUATION INHALER    Take 1 Puff by inhalation every four (4) hours as needed for Wheezing. ALBUTEROL (PROVENTIL HFA, VENTOLIN HFA, PROAIR HFA) 90 MCG/ACTUATION INHALER    TAKE 2 PUFFS BY MOUTH EVERY 4 HOURS AS NEEDED WHEEZING AND SHORTNESS OF BREATH    ALBUTEROL (PROVENTIL HFA, VENTOLIN HFA, PROAIR HFA) 90 MCG/ACTUATION INHALER    Take 2 Puffs by inhalation every four (4) hours as needed for Wheezing. ALBUTEROL-IPRATROPIUM (DUO-NEB) 2.5 MG-0.5 MG/3 ML NEBU    3 mL by Nebulization route every six (6) hours as needed for Wheezing. ALBUTEROL-IPRATROPIUM (DUO-NEB) 2.5 MG-0.5 MG/3 ML NEBU    3 mL by Nebulization route every four (4) hours as needed for Wheezing. BUPROPION XL (WELLBUTRIN XL) 150 MG TABLET    Take 1 Tablet by mouth daily. DULOXETINE (CYMBALTA) 60 MG CAPSULE    Take 1 Capsule by mouth daily. FLUTICASONE PROPION-SALMETEROL (ADVAIR/WIXELA) 250-50 MCG/DOSE DISKUS INHALER    Take 1 Puff by inhalation two (2) times a day. LORATADINE (CLARITIN) 10 MG TABLET    TAKE 1 TABLET BY MOUTH EVERY DAY    MONTELUKAST (SINGULAIR) 10 MG TABLET    Take 1 Tablet by mouth daily. TRAZODONE (DESYREL) 100 MG TABLET    Take 1 Tablet by mouth nightly.          PHYSICAL EXAM      ED Triage Vitals [03/14/23 2227]   ED Encounter Vitals Group      /63      Pulse (Heart Rate) (!) 127      Resp Rate 19      Temp 98.6 °F (37 °C)      Temp src       O2 Sat (%) 95 %      Weight 220 lb      Height 4' 11\"        Physical Exam  Vitals and nursing note reviewed. Constitutional:       General: She is in acute distress. HENT:      Nose: No congestion. Mouth/Throat:      Mouth: Mucous membranes are moist.   Eyes:      Conjunctiva/sclera: Conjunctivae normal.   Cardiovascular:      Rate and Rhythm: Regular rhythm. Tachycardia present. Pulses: Normal pulses. Heart sounds: Normal heart sounds. Pulmonary:      Effort: Tachypnea and accessory muscle usage present. No prolonged expiration. Breath sounds: No stridor or decreased air movement. Wheezing present. Comments: Diffuse wheezes bilaterally  Abdominal:      General: Bowel sounds are normal.      Palpations: Abdomen is soft. Tenderness: There is no abdominal tenderness. Musculoskeletal:         General: Normal range of motion. Cervical back: Neck supple. No rigidity. Right lower leg: No edema. Left lower leg: No edema. Lymphadenopathy:      Cervical: No cervical adenopathy. Skin:     General: Skin is warm and dry. Capillary Refill: Capillary refill takes less than 2 seconds. Findings: No rash. Neurological:      Mental Status: She is alert and oriented to person, place, and time. DIAGNOSTIC RESULTS   LABS:     No results found for this or any previous visit (from the past 24 hour(s)).         RADIOLOGY:  Non-plain film images such as CT, Ultrasound and MRI are read by the radiologist. Plain radiographic images are visualized and preliminarily interpreted by the ED Provider with the below findings:        Interpretation per the Radiologist below, if available at the time of this note:     No orders to display        [facility] NOTE (does not include separately billable procedure time)::    IMPENDING DETERIORATION -Respiratory  ASSOCIATED RISK FACTORS - Hypoxia  MANAGEMENT- Bedside Assessment and Supervision of Care  INTERPRETATION -  respiratory measures and o2 sat  INTERVENTIONS - hemodynamic mngmt and respiratory intervention  CASE REVIEW - Nursing  TREATMENT RESPONSE -Improved  PERFORMED BY - Self    NOTES   :    I have spent 45 minutes of critical care time involved in lab review, consultations with specialist, family decision- making, bedside attention and documentation. During this entire length of time I was immediately available to the patient. (This does not include time spent on performing separately billable procedures)    Critical Care: The reason for providing this level of medical care for this critically ill patient was due to a critical illness that impaired one or more vital organ systems, such that there was a high probability of imminent or life threatening deterioration in the patient's condition. This care involved high complexity decision making to assess, manipulate, and support vital system functions, to treat this degree of vital organ system failure, and to prevent further life threatening deterioration of the patients condition. Ness Hill MD         MEDICAL DECISION MAKING     Vitals:    Vitals:    03/14/23 2227   BP: 116/63   Pulse: (!) 127   Resp: 19   Temp: 98.6 °F (37 °C)   SpO2: 95%   Weight: 99.8 kg (220 lb)   Height: 4' 11\" (1.499 m)       Pertinent Chronic Medical Conditions or Prior Surgeries: Listed under PMH above and includes asthma    Social Determinants affecting Dx or Tx: None    Records Reviewed: Prior medical records and Nursing notes  I reviewed and interpreted the nursing notes and and vital signs from today's visit, as well as the electronic medical record system for any external medical records that were available that may contribute to the patients current condition.  Nursing notes will be reviewed and interpreted by me as they become available in realtime while the pt has been in the ED. Records reviewed include:  - internal medicine discharge [facility] Patient admitted for acute asthma exacerbation. Did well after regular nebs overnight, discharged on room air. Home on ventolin, advair, prednisone. Pulmonology appt set up. Independent history obtained from girlfriend, who is at bedside. Confirms story as documented in hpi and ed course. EKG interpretation: Rhythm: sinus; and regular . Rate (approx.): 118; ST/T wave: nml; Other intervals normal. This and prior available ECGs have been viewed and interpreted by me personally. Rohith Douglas MD, Msc    Cardiac monitor interpretation:    Regular sinus tachycardia  Pulse ox interpretation:   98% on ra, normal    CC/HPI Summary, DDx, MDM, ED Course, and Reassessment:     The patient presents to the ED with chief complaint of shortness of breath, chest tightness and wheezing. Has history of asthma, and current symptoms and evolution of symptoms most c/w asthma exacerbation. No AMS, silent respirations, or other sign of impending ventilatory failure. However she is audibly wheezing on exam, using accessory muscles to breathe and able to speak in only 3-4 word sentences. In the ED vital signs notable for no fever, tachycardia which is likely due neb treatments which she had just prior to arrival, o2 sat 96% on ra, RR slightly elevated at 22. Patient is not a smoker. Last hospitalized for asthma last October. Has never been intubated. Patient states that she has been on steroids frequently over the past year. She finished course of steroids just a few days ago. Believes that weather change and weaning off steroids has likely induced current exacerbation. Has referral to see a pulmonary specialist but has not made the appointment yet.     Differential diagnosis considered: includes asthma exacerbation, acute bronchitis; also considered pulmonary embolus, acute coronary syndrome, pneumothorax, pneumonia, or anemia but find each unlikely based on exam, history and overall gestalt. Planned Workup and Intervention: duonebs x 3, iv methylprednisolone, iv magnesium. Considered cxr but given no cough or fever, bacterial pna is unlikely so deferred for now. Reassessment:  Given the patients clinical history, past medical history, and exam findings, the  presentation was consistent with an acute asthma exacerbation. Patient showed significant improvement in symptoms after treatment with near resolution of symptoms on final reassessment. There was no significant hypoxia or evidence of respiratory distress on reevaluation. Lungs are now clear. Based on this, ok to dc home. Will prescribe bronchodilators if needed and a course of steroids. The patient should return for high fevers, worsening shortness of breath, difficulty breathing, severe chest pain, or fainting. The patient endorses understanding of return and follow-up instructions and had no further questions. ED Course as of 03/15/23 0040   Wed Mar 15, 2023   0019 Feeling better. Lungs now with only very minimal wheezing. Speaking in full sentences. Normal wob. O2 sat 100% on ra. [TZ]      ED Course User Index  [TZ] Noreen Patrick MD         Medical Decision Making  Amount and/or Complexity of Data Reviewed  Independent Historian: spouse     Details: as documented in ed course  ECG/medicine tests: ordered. Risk  OTC drugs. Prescription drug management. Drug therapy requiring intensive monitoring for toxicity. Decision regarding hospitalization. Disposition Considerations and Planning:  I have discussed with the patient and/or caregiver my initial clinical impression which is based on an evidence-based clinical evaluation of the patient and interpretation of available results.  Involved patient and/or caregiver in management, treatment options and final disposition. Patient and/or caregiver verbalizes understanding and agreement. ED Medications Administered  Medications   albuterol-ipratropium (DUO-NEB) 2.5 MG-0.5 MG/3 ML (has no administration in time range)   methylPREDNISolone (PF) (Solu-MEDROL) injection 125 mg (has no administration in time range)   magnesium sulfate 1 g/100 ml IVPB (premix or compounded) (has no administration in time range)       ED Orders Placed:  Orders Placed This Encounter    VITAL SIGNS    SALINE LOCK IV ONE TIME STAT    DISCONTD: albuterol-ipratropium (DUO-NEB) 2.5 MG-0.5 MG/3 ML    albuterol-ipratropium (DUO-NEB) 2.5 MG-0.5 MG/3 ML    methylPREDNISolone (PF) (Solu-MEDROL) injection 125 mg    magnesium sulfate 1 g/100 ml IVPB (premix or compounded)       FINAL IMPRESSION     1. Severe asthma with acute exacerbation, unspecified whether persistent          DISPOSITION/PLAN     Progress note:  Patient has been reassessed and reports feeling considerably better, has normal vital signs and feels comfortable going home. I think this is reasonable as no findings today suggest a life-threatening condition. Urged her to schedule follow up with pulmonary specialist as soon as able. In meantime, will start her on prednisone taper. She says that she has all her inhalers and does not need refills. Advised use q4h as needed. DISPOSITION: DISCHARGE  The patient's results have been reviewed with patient and available family and/or caregiver. They verbally convey their understanding and agreement of the patient's signs, symptoms, diagnosis, treatment and prognosis and additionally agree to follow up as recommended in the discharge instructions or to return to the Emergency Department should the patient's condition change prior to their follow-up appointment. The patient and available family and/or caregiver verbally agree with the care plan and all of their questions have been answered.  The discharge instructions have also been provided to the them with educational information regarding the patient's diagnosis as well a list of reasons why the patient would want to return to the ER prior to their follow-up appointment should any concerns arise, the patient's condition change or symptoms worsen. James Rodrigues MD, Msc    PLAN:  Current Discharge Medication List        START taking these medications    Details   predniSONE (STERAPRED DS) 10 mg dose pack Use as directed  Qty: 48 Tablet, Refills: 0  Start date: 3/15/2023           2. Follow-up Information       Follow up With Specialties Details Why Contact Info    pulmonary specialist  Schedule an appointment as soon as possible for a visit       Symone Winters NP Family Medicine Schedule an appointment as soon as possible for a visit in 2 days  [address] [place] [address] [address]      [facility] EMERGENCY DEPT Emergency Medicine  As needed, If symptoms worsen [facility]  [phone]          3. Return to ED if worse       I am the Primary Clinician of Record. Pankaj Padilla MD (electronically signed)    (Please note that parts of this dictation were completed with voice recognition software. Quite often unanticipated grammatical, syntax, homophones, and other interpretive errors are inadvertently transcribed by the computer software. Please disregards these errors.  Please excuse any errors that have escaped final proofreading.)

## 2023-03-15 NOTE — DISCHARGE INSTRUCTIONS
It was a pleasure taking care of you in our Emergency Department today. We know that when you come to [facility], you are entrusting us with your health, comfort, and safety. Our physicians and nurses honor that trust, and truly appreciate the opportunity to care for you and your loved ones. We also value your feedback. If you receive a survey about your Emergency Department experience today, please fill it out. We care about our patients' feedback, and we listen to what you have to say. Thank you!       Dr. Cole Valerio MD

## 2023-03-15 NOTE — ED NOTES
Emergency Department Nursing Plan of Care       The Nursing Plan of Care is developed from the Nursing assessment and Emergency Department Attending provider initial evaluation. The plan of care may be reviewed in the ED Provider note.     The Plan of Care was developed with the following considerations:   Patient / Family readiness to learn indicated by:verbalized understanding  Persons(s) to be included in education: patient  Barriers to Learning/Limitations:No    [address]    3/15/2023   12:51 AM

## 2023-05-25 ENCOUNTER — APPOINTMENT (RX ONLY)
Dept: URBAN - METROPOLITAN AREA CLINIC 122 | Facility: CLINIC | Age: 33
Setting detail: DERMATOLOGY
End: 2023-05-25

## 2023-05-25 DIAGNOSIS — D22 MELANOCYTIC NEVI: ICD-10-CM

## 2023-05-25 DIAGNOSIS — Z87.2 PERSONAL HISTORY OF DISEASES OF THE SKIN AND SUBCUTANEOUS TISSUE: ICD-10-CM

## 2023-05-25 PROBLEM — D22.71 MELANOCYTIC NEVI OF RIGHT LOWER LIMB, INCLUDING HIP: Status: ACTIVE | Noted: 2023-05-25

## 2023-05-25 PROCEDURE — ? COUNSELING

## 2023-05-25 PROCEDURE — 99212 OFFICE O/P EST SF 10 MIN: CPT

## 2023-05-25 ASSESSMENT — LOCATION ZONE DERM: LOCATION ZONE: LEG

## 2023-05-25 ASSESSMENT — LOCATION DETAILED DESCRIPTION DERM
LOCATION DETAILED: RIGHT PROXIMAL LATERAL POSTERIOR THIGH
LOCATION DETAILED: RIGHT DISTAL PRETIBIAL REGION

## 2023-05-25 ASSESSMENT — LOCATION SIMPLE DESCRIPTION DERM
LOCATION SIMPLE: RIGHT POSTERIOR THIGH
LOCATION SIMPLE: RIGHT PRETIBIAL REGION

## 2023-05-25 NOTE — HPI: SKIN LESION
What Type Of Note Output Would You Prefer (Optional)?: Standard Output
Has Your Skin Lesion Been Treated?: not been treated
Is This A New Presentation, Or A Follow-Up?: Skin Lesion
Which Family Member (Optional)?: Maternal aunt

## 2023-06-20 ENCOUNTER — HOSPITAL ENCOUNTER (EMERGENCY)
Facility: HOSPITAL | Age: 33
Discharge: HOME | End: 2023-06-20
Attending: EMERGENCY MEDICINE
Payer: MEDICAID

## 2023-06-20 VITALS
SYSTOLIC BLOOD PRESSURE: 116 MMHG | HEART RATE: 60 BPM | HEIGHT: 60 IN | OXYGEN SATURATION: 97 % | DIASTOLIC BLOOD PRESSURE: 75 MMHG | RESPIRATION RATE: 19 BRPM | BODY MASS INDEX: 44.17 KG/M2 | TEMPERATURE: 98.3 F | WEIGHT: 225 LBS

## 2023-06-20 DIAGNOSIS — M79.89 SWELLING OF BOTH HANDS: Primary | ICD-10-CM

## 2023-06-20 DIAGNOSIS — M79.89 SWELLING OF LOWER EXTREMITY: ICD-10-CM

## 2023-06-20 DIAGNOSIS — L23.9 ALLERGIC CONTACT DERMATITIS OF LOWER LEG: ICD-10-CM

## 2023-06-20 LAB
ANION GAP BLD CALC-SCNC: 14 MMOL/L (ref 10–20)
CA-I BLD-MCNC: 1.21 MMOL/L (ref 1.12–1.32)
CHLORIDE BLD-SCNC: 104 MMOL/L (ref 98–107)
CO2 BLD-SCNC: 24.8 MMOL/L (ref 21–32)
CREAT BLD-MCNC: 0.53 MG/DL (ref 0.6–1.3)
GLUCOSE BLD-MCNC: 78 MG/DL (ref 65–100)
POTASSIUM BLD-SCNC: 4 MMOL/L (ref 3.5–5.1)
SERVICE CMNT-IMP: ABNORMAL
SODIUM BLD-SCNC: 142 MMOL/L (ref 136–145)

## 2023-06-20 PROCEDURE — 80047 BASIC METABLC PNL IONIZED CA: CPT

## 2023-06-20 PROCEDURE — 99283 EMERGENCY DEPT VISIT LOW MDM: CPT

## 2023-06-20 ASSESSMENT — PAIN SCALES - GENERAL: PAINLEVEL_OUTOF10: 0

## 2023-06-20 ASSESSMENT — PAIN - FUNCTIONAL ASSESSMENT: PAIN_FUNCTIONAL_ASSESSMENT: 0-10

## 2023-06-20 NOTE — ED NOTES
Pt presents ambulatory to ED complaining of waking up for 2 days with leg swelling and left hand swelling with pins/needles. Swelling has subsided but still has rash to left calf. Pt is alert and oriented x 4, RR even and unlabored, skin is warm and dry. Assesment completed and pt updated on plan of care. Emergency Department Nursing Plan of Care       The Nursing Plan of Care is developed from the Nursing assessment and Emergency Department Attending provider initial evaluation. The plan of care may be reviewed in the ED Provider note.     The Plan of Care was developed with the following considerations:   Patient / Family readiness to learn indicated by:verbalized understanding and appropriate questions asked  Persons(s) to be included in education: patient  Barriers to Learning/Limitations:None    Signed        Rhianna Alvarado RN  06/20/23 1976  used

## 2023-06-20 NOTE — ED NOTES
Discharge instructions were given to the patient by Sun Harding RN    The patient left the Emergency Department ambulatory, alert and oriented and in no acute distress with 1 prescriptions. The patient was encouraged to call or return to the ED for worsening issues or problems and was encouraged to schedule a follow up appointment for continuing care. The patient verbalized understanding of discharge instructions and prescriptions, all questions were answered. The patient has no further concerns at this time.          Casper Mishra RN  06/20/23 9656

## 2023-06-20 NOTE — ED PROVIDER NOTES
[facility] EMERGENCY DEPT  EMERGENCY DEPARTMENT ENCOUNTER         Pt Name: Martin Leblanc  MRN: [id number]  [place] 1990  Date of evaluation: 6/20/2023  Provider: Antoinette Lopez PA-C   PCP: ZOLTAN Holley NP  Note Started: 3:48 PM 6/20/23     CHIEF COMPLAINT       Chief Complaint   Patient presents with    Leg Swelling     Per pt reports bilateral leg swelling/pain that started yesterday, +left posterior lower leg rash, +redness and itching x today. Denies PMHx of diabetes and HTN. HISTORY OF PRESENT ILLNESS: 1 or more elements      History From: Patient  HPI Limitations: None     Martin Leblanc is a 29 y.o. female who presents bilateral hand swelling and lower extremity swelling for the last several days. Patient denies any injury or trauma but states when she wakes up in the morning she feels that her hands are tight and tingly as well as her lower extremity. She also noted a rash to the left calf today with associated redness and itching but denies any known injury or trauma or insect bites. Nursing Notes were all reviewed and agreed with or any disagreements were addressed in the HPI. REVIEW OF SYSTEMS      Review of Systems     Positives and Pertinent negatives as per HPI.     PAST HISTORY     Past Medical History:  Past Medical History:   Diagnosis Date    Anxiety     Asthma     Depression     Ill-defined condition     Headache    Nausea & vomiting        Past Surgical History:  Past Surgical History:   Procedure Laterality Date    GI      Choleycystectomy    GYN      3 C-sections    [place] UNLISTED PROCEDURE ABDOMEN PERITONEUM & OMENTUM  2014    Ventral Hernia Repair       Family History:  Family History   Problem Relation Age of Onset    Bipolar Disorder Father     Celiac Disease Mother     Other Mother         fibromyalgia    Migraines Mother     Depression Mother        Social History:  Social History     Tobacco Use    Smoking status: Never    Smokeless tobacco: Never   Substance Use

## 2023-08-02 ENCOUNTER — APPOINTMENT (RX ONLY)
Dept: URBAN - METROPOLITAN AREA CLINIC 122 | Facility: CLINIC | Age: 33
Setting detail: DERMATOLOGY
End: 2023-08-02

## 2023-08-02 DIAGNOSIS — Z71.89 OTHER SPECIFIED COUNSELING: ICD-10-CM

## 2023-08-02 DIAGNOSIS — L81.2 FRECKLES: ICD-10-CM

## 2023-08-02 DIAGNOSIS — Z87.2 PERSONAL HISTORY OF DISEASES OF THE SKIN AND SUBCUTANEOUS TISSUE: ICD-10-CM

## 2023-08-02 DIAGNOSIS — D18.0 HEMANGIOMA: ICD-10-CM

## 2023-08-02 DIAGNOSIS — L81.4 OTHER MELANIN HYPERPIGMENTATION: ICD-10-CM

## 2023-08-02 DIAGNOSIS — L82.1 OTHER SEBORRHEIC KERATOSIS: ICD-10-CM

## 2023-08-02 DIAGNOSIS — Z85.828 PERSONAL HISTORY OF OTHER MALIGNANT NEOPLASM OF SKIN: ICD-10-CM

## 2023-08-02 DIAGNOSIS — D22 MELANOCYTIC NEVI: ICD-10-CM

## 2023-08-02 PROBLEM — D22.61 MELANOCYTIC NEVI OF RIGHT UPPER LIMB, INCLUDING SHOULDER: Status: ACTIVE | Noted: 2023-08-02

## 2023-08-02 PROBLEM — D22.5 MELANOCYTIC NEVI OF TRUNK: Status: ACTIVE | Noted: 2023-08-02

## 2023-08-02 PROBLEM — D22.4 MELANOCYTIC NEVI OF SCALP AND NECK: Status: ACTIVE | Noted: 2023-08-02

## 2023-08-02 PROBLEM — D22.62 MELANOCYTIC NEVI OF LEFT UPPER LIMB, INCLUDING SHOULDER: Status: ACTIVE | Noted: 2023-08-02

## 2023-08-02 PROBLEM — D22.72 MELANOCYTIC NEVI OF LEFT LOWER LIMB, INCLUDING HIP: Status: ACTIVE | Noted: 2023-08-02

## 2023-08-02 PROBLEM — D18.01 HEMANGIOMA OF SKIN AND SUBCUTANEOUS TISSUE: Status: ACTIVE | Noted: 2023-08-02

## 2023-08-02 PROBLEM — D22.39 MELANOCYTIC NEVI OF OTHER PARTS OF FACE: Status: ACTIVE | Noted: 2023-08-02

## 2023-08-02 PROBLEM — D22.71 MELANOCYTIC NEVI OF RIGHT LOWER LIMB, INCLUDING HIP: Status: ACTIVE | Noted: 2023-08-02

## 2023-08-02 PROCEDURE — ? COUNSELING

## 2023-08-02 PROCEDURE — 99213 OFFICE O/P EST LOW 20 MIN: CPT

## 2023-08-02 ASSESSMENT — LOCATION SIMPLE DESCRIPTION DERM
LOCATION SIMPLE: RIGHT FOOT
LOCATION SIMPLE: LEFT FOREARM
LOCATION SIMPLE: LEFT FOOT
LOCATION SIMPLE: CHEST
LOCATION SIMPLE: RIGHT BREAST
LOCATION SIMPLE: RIGHT FOREHEAD
LOCATION SIMPLE: ABDOMEN
LOCATION SIMPLE: RIGHT CHEEK
LOCATION SIMPLE: FRONTAL SCALP
LOCATION SIMPLE: LEFT PRETIBIAL REGION
LOCATION SIMPLE: RIGHT PRETIBIAL REGION
LOCATION SIMPLE: UPPER BACK
LOCATION SIMPLE: LEFT THIGH
LOCATION SIMPLE: LEFT CHEEK
LOCATION SIMPLE: LEFT CALF
LOCATION SIMPLE: RIGHT FOREARM
LOCATION SIMPLE: RIGHT POSTERIOR UPPER ARM
LOCATION SIMPLE: LEFT POSTERIOR UPPER ARM
LOCATION SIMPLE: RIGHT UPPER BACK
LOCATION SIMPLE: LEFT SCALP
LOCATION SIMPLE: RIGHT CALF
LOCATION SIMPLE: LEFT BUTTOCK

## 2023-08-02 ASSESSMENT — LOCATION DETAILED DESCRIPTION DERM
LOCATION DETAILED: RIGHT DISTAL POSTERIOR UPPER ARM
LOCATION DETAILED: INFERIOR THORACIC SPINE
LOCATION DETAILED: SUPERIOR THORACIC SPINE
LOCATION DETAILED: RIGHT SUPERIOR MEDIAL UPPER BACK
LOCATION DETAILED: LEFT BUTTOCK
LOCATION DETAILED: LEFT PROXIMAL RADIAL DORSAL FOREARM
LOCATION DETAILED: LEFT INFERIOR CENTRAL MALAR CHEEK
LOCATION DETAILED: MIDDLE STERNUM
LOCATION DETAILED: LEFT MEDIAL FRONTAL SCALP
LOCATION DETAILED: LEFT DORSAL FOOT
LOCATION DETAILED: RIGHT LATERAL PROXIMAL PRETIBIAL REGION
LOCATION DETAILED: LEFT ANTERIOR DISTAL THIGH
LOCATION DETAILED: LEFT PROXIMAL POSTERIOR UPPER ARM
LOCATION DETAILED: LEFT DISTAL POSTERIOR UPPER ARM
LOCATION DETAILED: RIGHT MEDIAL FOREHEAD
LOCATION DETAILED: MEDIAL FRONTAL SCALP
LOCATION DETAILED: RIGHT PROXIMAL PRETIBIAL REGION
LOCATION DETAILED: RIGHT PROXIMAL DORSAL FOREARM
LOCATION DETAILED: RIGHT MEDIAL INFERIOR CHEST
LOCATION DETAILED: LEFT CENTRAL MALAR CHEEK
LOCATION DETAILED: LEFT PROXIMAL CALF
LOCATION DETAILED: LEFT PROXIMAL PRETIBIAL REGION
LOCATION DETAILED: EPIGASTRIC SKIN
LOCATION DETAILED: RIGHT MEDIAL BREAST 4-5:00 REGION
LOCATION DETAILED: RIGHT PROXIMAL POSTERIOR UPPER ARM
LOCATION DETAILED: RIGHT CENTRAL MALAR CHEEK
LOCATION DETAILED: RIGHT PROXIMAL LATERAL CALF
LOCATION DETAILED: RIGHT DORSAL FOOT
LOCATION DETAILED: RIGHT INFERIOR CENTRAL MALAR CHEEK

## 2023-08-02 ASSESSMENT — LOCATION ZONE DERM
LOCATION ZONE: TRUNK
LOCATION ZONE: LEG
LOCATION ZONE: FEET
LOCATION ZONE: FACE
LOCATION ZONE: SCALP
LOCATION ZONE: ARM

## 2023-09-17 RX ORDER — DULOXETIN HYDROCHLORIDE 60 MG/1
60 CAPSULE, DELAYED RELEASE ORAL DAILY
Qty: 30 CAPSULE | Refills: 5 | Status: SHIPPED | OUTPATIENT
Start: 2023-09-17

## 2023-09-17 RX ORDER — MONTELUKAST SODIUM 10 MG/1
10 TABLET ORAL DAILY
Qty: 30 TABLET | Refills: 5 | Status: SHIPPED | OUTPATIENT
Start: 2023-09-17

## 2023-11-06 ENCOUNTER — APPOINTMENT (OUTPATIENT)
Facility: HOSPITAL | Age: 33
DRG: 202 | End: 2023-11-06
Payer: COMMERCIAL

## 2023-11-06 ENCOUNTER — HOSPITAL ENCOUNTER (INPATIENT)
Facility: HOSPITAL | Age: 33
LOS: 2 days | Discharge: HOME | DRG: 202 | End: 2023-11-08
Attending: EMERGENCY MEDICINE | Admitting: INTERNAL MEDICINE
Payer: COMMERCIAL

## 2023-11-06 DIAGNOSIS — E87.6 HYPOKALEMIA: ICD-10-CM

## 2023-11-06 DIAGNOSIS — J45.51 SEVERE PERSISTENT ASTHMA WITH EXACERBATION: Primary | ICD-10-CM

## 2023-11-06 PROBLEM — J45.901 ACUTE ASTHMA EXACERBATION: Status: ACTIVE | Noted: 2023-11-06

## 2023-11-06 LAB
ALBUMIN SERPL-MCNC: 3.3 G/DL (ref 3.5–5)
ALBUMIN/GLOB SERPL: 0.8 (ref 1.1–2.2)
ALP SERPL-CCNC: 114 U/L (ref 45–117)
ALT SERPL-CCNC: 67 U/L (ref 12–78)
ANION GAP SERPL CALC-SCNC: 12 MMOL/L (ref 5–15)
ARTERIAL PATENCY WRIST A: YES
AST SERPL-CCNC: 44 U/L (ref 15–37)
BASE DEFICIT BLDA-SCNC: 8.7 MMOL/L
BASOPHILS # BLD: 0 K/UL (ref 0–0.1)
BASOPHILS NFR BLD: 0 % (ref 0–1)
BDY SITE: ABNORMAL
BILIRUB SERPL-MCNC: 0.2 MG/DL (ref 0.2–1)
BUN SERPL-MCNC: 18 MG/DL (ref 6–20)
BUN/CREAT SERPL: 19 (ref 12–20)
CALCIUM SERPL-MCNC: 8.6 MG/DL (ref 8.5–10.1)
CHLORIDE SERPL-SCNC: 105 MMOL/L (ref 97–108)
CO2 SERPL-SCNC: 25 MMOL/L (ref 21–32)
CREAT SERPL-MCNC: 0.95 MG/DL (ref 0.55–1.02)
D DIMER PPP FEU-MCNC: 1.6 MG/L FEU (ref 0–0.65)
DIFFERENTIAL METHOD BLD: ABNORMAL
EOSINOPHIL # BLD: 0 K/UL (ref 0–0.4)
EOSINOPHIL NFR BLD: 0 % (ref 0–7)
ERYTHROCYTE [DISTWIDTH] IN BLOOD BY AUTOMATED COUNT: 13.9 % (ref 11.5–14.5)
FLUAV RNA SPEC QL NAA+PROBE: DETECTED
FLUBV RNA SPEC QL NAA+PROBE: NOT DETECTED
GLOBULIN SER CALC-MCNC: 4.4 G/DL (ref 2–4)
GLUCOSE SERPL-MCNC: 128 MG/DL (ref 65–100)
HCG SERPL QL: NEGATIVE
HCO3 BLDA-SCNC: 15 MMOL/L (ref 22–26)
HCT VFR BLD AUTO: 36.7 % (ref 35–47)
HGB BLD-MCNC: 11.8 G/DL (ref 11.5–16)
IMM GRANULOCYTES # BLD AUTO: 0.1 K/UL (ref 0–0.04)
IMM GRANULOCYTES NFR BLD AUTO: 0 % (ref 0–0.5)
LYMPHOCYTES # BLD: 1.3 K/UL (ref 0.8–3.5)
LYMPHOCYTES NFR BLD: 10 % (ref 12–49)
MCH RBC QN AUTO: 26.2 PG (ref 26–34)
MCHC RBC AUTO-ENTMCNC: 32.2 G/DL (ref 30–36.5)
MCV RBC AUTO: 81.4 FL (ref 80–99)
MONOCYTES # BLD: 0.6 K/UL (ref 0–1)
MONOCYTES NFR BLD: 5 % (ref 5–13)
NEUTS SEG # BLD: 10.4 K/UL (ref 1.8–8)
NEUTS SEG NFR BLD: 85 % (ref 32–75)
NRBC # BLD: 0 K/UL (ref 0–0.01)
NRBC BLD-RTO: 0 PER 100 WBC
PCO2 BLDA: 27 MMHG (ref 35–45)
PH BLDA: 7.36 (ref 7.35–7.45)
PLATELET # BLD AUTO: 384 K/UL (ref 150–400)
PMV BLD AUTO: 9.4 FL (ref 8.9–12.9)
PO2 BLDA: 86 MMHG (ref 80–100)
POTASSIUM SERPL-SCNC: 2.7 MMOL/L (ref 3.5–5.1)
PROT SERPL-MCNC: 7.7 G/DL (ref 6.4–8.2)
RBC # BLD AUTO: 4.51 M/UL (ref 3.8–5.2)
SAO2 % BLD: 96 % (ref 92–97)
SAO2% DEVICE SAO2% SENSOR NAME: ABNORMAL
SARS-COV-2 RNA RESP QL NAA+PROBE: NOT DETECTED
SODIUM SERPL-SCNC: 142 MMOL/L (ref 136–145)
SPECIMEN SITE: ABNORMAL
TROPONIN I SERPL HS-MCNC: 12 NG/L (ref 0–51)
WBC # BLD AUTO: 12.3 K/UL (ref 3.6–11)

## 2023-11-06 PROCEDURE — 6360000002 HC RX W HCPCS: Performed by: HOSPITALIST

## 2023-11-06 PROCEDURE — 6370000000 HC RX 637 (ALT 250 FOR IP): Performed by: STUDENT IN AN ORGANIZED HEALTH CARE EDUCATION/TRAINING PROGRAM

## 2023-11-06 PROCEDURE — 1200000000 HC SEMI PRIVATE

## 2023-11-06 PROCEDURE — 36415 COLL VENOUS BLD VENIPUNCTURE: CPT

## 2023-11-06 PROCEDURE — 93005 ELECTROCARDIOGRAM TRACING: CPT | Performed by: INTERNAL MEDICINE

## 2023-11-06 PROCEDURE — 6360000002 HC RX W HCPCS: Performed by: EMERGENCY MEDICINE

## 2023-11-06 PROCEDURE — 87636 SARSCOV2 & INF A&B AMP PRB: CPT

## 2023-11-06 PROCEDURE — 84703 CHORIONIC GONADOTROPIN ASSAY: CPT

## 2023-11-06 PROCEDURE — 6370000000 HC RX 637 (ALT 250 FOR IP): Performed by: EMERGENCY MEDICINE

## 2023-11-06 PROCEDURE — 96375 TX/PRO/DX INJ NEW DRUG ADDON: CPT

## 2023-11-06 PROCEDURE — 94640 AIRWAY INHALATION TREATMENT: CPT

## 2023-11-06 PROCEDURE — 71275 CT ANGIOGRAPHY CHEST: CPT

## 2023-11-06 PROCEDURE — 6360000002 HC RX W HCPCS: Performed by: INTERNAL MEDICINE

## 2023-11-06 PROCEDURE — 87070 CULTURE OTHR SPECIMN AEROBIC: CPT

## 2023-11-06 PROCEDURE — 80053 COMPREHEN METABOLIC PANEL: CPT

## 2023-11-06 PROCEDURE — 84484 ASSAY OF TROPONIN QUANT: CPT

## 2023-11-06 PROCEDURE — 96366 THER/PROPH/DIAG IV INF ADDON: CPT

## 2023-11-06 PROCEDURE — 96367 TX/PROPH/DG ADDL SEQ IV INF: CPT

## 2023-11-06 PROCEDURE — 6370000000 HC RX 637 (ALT 250 FOR IP): Performed by: INTERNAL MEDICINE

## 2023-11-06 PROCEDURE — 82803 BLOOD GASES ANY COMBINATION: CPT

## 2023-11-06 PROCEDURE — 6360000004 HC RX CONTRAST MEDICATION: Performed by: INTERNAL MEDICINE

## 2023-11-06 PROCEDURE — 94760 N-INVAS EAR/PLS OXIMETRY 1: CPT

## 2023-11-06 PROCEDURE — 85379 FIBRIN DEGRADATION QUANT: CPT

## 2023-11-06 PROCEDURE — 96361 HYDRATE IV INFUSION ADD-ON: CPT

## 2023-11-06 PROCEDURE — 99285 EMERGENCY DEPT VISIT HI MDM: CPT

## 2023-11-06 PROCEDURE — 2580000003 HC RX 258: Performed by: STUDENT IN AN ORGANIZED HEALTH CARE EDUCATION/TRAINING PROGRAM

## 2023-11-06 PROCEDURE — 96365 THER/PROPH/DIAG IV INF INIT: CPT

## 2023-11-06 PROCEDURE — 94664 DEMO&/EVAL PT USE INHALER: CPT

## 2023-11-06 PROCEDURE — 87205 SMEAR GRAM STAIN: CPT

## 2023-11-06 PROCEDURE — 36600 WITHDRAWAL OF ARTERIAL BLOOD: CPT

## 2023-11-06 PROCEDURE — 71045 X-RAY EXAM CHEST 1 VIEW: CPT

## 2023-11-06 PROCEDURE — 2580000003 HC RX 258: Performed by: EMERGENCY MEDICINE

## 2023-11-06 PROCEDURE — 85025 COMPLETE CBC W/AUTO DIFF WBC: CPT

## 2023-11-06 PROCEDURE — 2580000003 HC RX 258: Performed by: INTERNAL MEDICINE

## 2023-11-06 RX ORDER — POLYETHYLENE GLYCOL 3350 17 G/17G
17 POWDER, FOR SOLUTION ORAL DAILY PRN
Status: DISCONTINUED | OUTPATIENT
Start: 2023-11-06 | End: 2023-11-08 | Stop reason: HOSPADM

## 2023-11-06 RX ORDER — IPRATROPIUM BROMIDE AND ALBUTEROL SULFATE 2.5; .5 MG/3ML; MG/3ML
1 SOLUTION RESPIRATORY (INHALATION)
Status: COMPLETED | OUTPATIENT
Start: 2023-11-06 | End: 2023-11-06

## 2023-11-06 RX ORDER — POTASSIUM CHLORIDE 750 MG/1
40 TABLET, FILM COATED, EXTENDED RELEASE ORAL PRN
Status: DISCONTINUED | OUTPATIENT
Start: 2023-11-06 | End: 2023-11-08 | Stop reason: HOSPADM

## 2023-11-06 RX ORDER — BUSPIRONE HYDROCHLORIDE 5 MG/1
5 TABLET ORAL DAILY
Status: ON HOLD | COMMUNITY
End: 2023-11-08 | Stop reason: SDUPTHER

## 2023-11-06 RX ORDER — PREDNISONE 10 MG/1
TABLET ORAL
Qty: 21 EACH | Refills: 0 | Status: SHIPPED | OUTPATIENT
Start: 2023-11-06 | End: 2023-11-06 | Stop reason: ALTCHOICE

## 2023-11-06 RX ORDER — SODIUM CHLORIDE 9 MG/ML
INJECTION, SOLUTION INTRAVENOUS PRN
Status: DISCONTINUED | OUTPATIENT
Start: 2023-11-06 | End: 2023-11-08 | Stop reason: HOSPADM

## 2023-11-06 RX ORDER — ACETAMINOPHEN 650 MG/1
650 SUPPOSITORY RECTAL EVERY 6 HOURS PRN
Status: DISCONTINUED | OUTPATIENT
Start: 2023-11-06 | End: 2023-11-08 | Stop reason: HOSPADM

## 2023-11-06 RX ORDER — POTASSIUM CHLORIDE 7.45 MG/ML
10 INJECTION INTRAVENOUS
Status: DISPENSED | OUTPATIENT
Start: 2023-11-06 | End: 2023-11-06

## 2023-11-06 RX ORDER — ALBUTEROL SULFATE 2.5 MG/3ML
10 SOLUTION RESPIRATORY (INHALATION)
Status: COMPLETED | OUTPATIENT
Start: 2023-11-06 | End: 2023-11-06

## 2023-11-06 RX ORDER — FLUTICASONE FUROATE AND VILANTEROL 200; 25 UG/1; UG/1
1 POWDER RESPIRATORY (INHALATION) 2 TIMES DAILY
Status: ON HOLD | COMMUNITY
End: 2023-11-08 | Stop reason: SDUPTHER

## 2023-11-06 RX ORDER — GUAIFENESIN 600 MG/1
600 TABLET, EXTENDED RELEASE ORAL 2 TIMES DAILY
Status: DISCONTINUED | OUTPATIENT
Start: 2023-11-06 | End: 2023-11-08 | Stop reason: HOSPADM

## 2023-11-06 RX ORDER — BUSPIRONE HYDROCHLORIDE 5 MG/1
5 TABLET ORAL DAILY
Status: DISCONTINUED | OUTPATIENT
Start: 2023-11-06 | End: 2023-11-08 | Stop reason: HOSPADM

## 2023-11-06 RX ORDER — POTASSIUM CHLORIDE 7.45 MG/ML
10 INJECTION INTRAVENOUS
Status: DISCONTINUED | OUTPATIENT
Start: 2023-11-06 | End: 2023-11-06

## 2023-11-06 RX ORDER — QUETIAPINE FUMARATE 50 MG/1
50 TABLET, FILM COATED ORAL NIGHTLY PRN
Status: ON HOLD | COMMUNITY
End: 2023-11-08 | Stop reason: SDUPTHER

## 2023-11-06 RX ORDER — BUDESONIDE 0.5 MG/2ML
0.5 INHALANT ORAL
Status: DISCONTINUED | OUTPATIENT
Start: 2023-11-06 | End: 2023-11-08 | Stop reason: HOSPADM

## 2023-11-06 RX ORDER — ALBUTEROL SULFATE 2.5 MG/3ML
SOLUTION RESPIRATORY (INHALATION)
Status: DISPENSED
Start: 2023-11-06 | End: 2023-11-06

## 2023-11-06 RX ORDER — POTASSIUM CHLORIDE 750 MG/1
40 TABLET, FILM COATED, EXTENDED RELEASE ORAL ONCE
Status: COMPLETED | OUTPATIENT
Start: 2023-11-06 | End: 2023-11-06

## 2023-11-06 RX ORDER — ENOXAPARIN SODIUM 100 MG/ML
30 INJECTION SUBCUTANEOUS 2 TIMES DAILY
Status: DISCONTINUED | OUTPATIENT
Start: 2023-11-06 | End: 2023-11-08 | Stop reason: HOSPADM

## 2023-11-06 RX ORDER — CETIRIZINE HYDROCHLORIDE 10 MG/1
10 TABLET ORAL DAILY
Status: DISCONTINUED | OUTPATIENT
Start: 2023-11-06 | End: 2023-11-08 | Stop reason: HOSPADM

## 2023-11-06 RX ORDER — ONDANSETRON 2 MG/ML
4 INJECTION INTRAMUSCULAR; INTRAVENOUS EVERY 6 HOURS PRN
Status: DISCONTINUED | OUTPATIENT
Start: 2023-11-06 | End: 2023-11-08 | Stop reason: HOSPADM

## 2023-11-06 RX ORDER — MAGNESIUM SULFATE IN WATER 40 MG/ML
2000 INJECTION, SOLUTION INTRAVENOUS
Status: COMPLETED | OUTPATIENT
Start: 2023-11-06 | End: 2023-11-06

## 2023-11-06 RX ORDER — ARFORMOTEROL TARTRATE 15 UG/2ML
15 SOLUTION RESPIRATORY (INHALATION)
Status: DISCONTINUED | OUTPATIENT
Start: 2023-11-06 | End: 2023-11-08 | Stop reason: HOSPADM

## 2023-11-06 RX ORDER — PREDNISONE 10 MG/1
10 TABLET ORAL DAILY PRN
Status: ON HOLD | COMMUNITY
End: 2023-11-08 | Stop reason: HOSPADM

## 2023-11-06 RX ORDER — ALBUTEROL SULFATE 90 UG/1
1 AEROSOL, METERED RESPIRATORY (INHALATION) EVERY 4 HOURS PRN
Qty: 18 G | Refills: 0 | Status: SHIPPED | OUTPATIENT
Start: 2023-11-06 | End: 2023-11-08 | Stop reason: SDUPTHER

## 2023-11-06 RX ORDER — LEVOFLOXACIN 500 MG/1
500 TABLET, FILM COATED ORAL DAILY
Status: DISCONTINUED | OUTPATIENT
Start: 2023-11-06 | End: 2023-11-08 | Stop reason: HOSPADM

## 2023-11-06 RX ORDER — OSELTAMIVIR PHOSPHATE 75 MG/1
75 CAPSULE ORAL 2 TIMES DAILY
Status: DISCONTINUED | OUTPATIENT
Start: 2023-11-07 | End: 2023-11-08 | Stop reason: HOSPADM

## 2023-11-06 RX ORDER — POTASSIUM CHLORIDE 20 MEQ/1
20 TABLET, EXTENDED RELEASE ORAL 2 TIMES DAILY
Qty: 10 TABLET | Refills: 0 | Status: SHIPPED | OUTPATIENT
Start: 2023-11-06 | End: 2023-11-11

## 2023-11-06 RX ORDER — BUPROPION HYDROCHLORIDE 150 MG/1
150 TABLET ORAL DAILY
Status: DISCONTINUED | OUTPATIENT
Start: 2023-11-06 | End: 2023-11-08 | Stop reason: HOSPADM

## 2023-11-06 RX ORDER — MAGNESIUM SULFATE IN WATER 40 MG/ML
2000 INJECTION, SOLUTION INTRAVENOUS PRN
Status: DISCONTINUED | OUTPATIENT
Start: 2023-11-06 | End: 2023-11-08 | Stop reason: HOSPADM

## 2023-11-06 RX ORDER — ALBUTEROL SULFATE 2.5 MG/3ML
2.5 SOLUTION RESPIRATORY (INHALATION)
Status: DISPENSED | OUTPATIENT
Start: 2023-11-06 | End: 2023-11-07

## 2023-11-06 RX ORDER — SODIUM CHLORIDE 0.9 % (FLUSH) 0.9 %
5-40 SYRINGE (ML) INJECTION PRN
Status: DISCONTINUED | OUTPATIENT
Start: 2023-11-06 | End: 2023-11-08 | Stop reason: HOSPADM

## 2023-11-06 RX ORDER — BENZONATATE 100 MG/1
100 CAPSULE ORAL 3 TIMES DAILY PRN
Status: DISCONTINUED | OUTPATIENT
Start: 2023-11-06 | End: 2023-11-08 | Stop reason: HOSPADM

## 2023-11-06 RX ORDER — KETOROLAC TROMETHAMINE 30 MG/ML
30 INJECTION, SOLUTION INTRAMUSCULAR; INTRAVENOUS ONCE
Status: COMPLETED | OUTPATIENT
Start: 2023-11-06 | End: 2023-11-06

## 2023-11-06 RX ORDER — SODIUM CHLORIDE 0.9 % (FLUSH) 0.9 %
5-40 SYRINGE (ML) INJECTION EVERY 12 HOURS SCHEDULED
Status: DISCONTINUED | OUTPATIENT
Start: 2023-11-06 | End: 2023-11-08 | Stop reason: HOSPADM

## 2023-11-06 RX ORDER — ALBUTEROL SULFATE 2.5 MG/3ML
10 SOLUTION RESPIRATORY (INHALATION)
Status: ACTIVE | OUTPATIENT
Start: 2023-11-06 | End: 2023-11-06

## 2023-11-06 RX ORDER — IPRATROPIUM BROMIDE AND ALBUTEROL SULFATE 2.5; .5 MG/3ML; MG/3ML
1 SOLUTION RESPIRATORY (INHALATION) EVERY 4 HOURS PRN
Status: COMPLETED | OUTPATIENT
Start: 2023-11-07 | End: 2023-11-08

## 2023-11-06 RX ORDER — MAGNESIUM SULFATE IN WATER 40 MG/ML
INJECTION, SOLUTION INTRAVENOUS
Status: DISPENSED
Start: 2023-11-06 | End: 2023-11-06

## 2023-11-06 RX ORDER — POTASSIUM CHLORIDE 7.45 MG/ML
10 INJECTION INTRAVENOUS PRN
Status: DISCONTINUED | OUTPATIENT
Start: 2023-11-06 | End: 2023-11-08 | Stop reason: HOSPADM

## 2023-11-06 RX ORDER — 0.9 % SODIUM CHLORIDE 0.9 %
1000 INTRAVENOUS SOLUTION INTRAVENOUS ONCE
Status: COMPLETED | OUTPATIENT
Start: 2023-11-06 | End: 2023-11-06

## 2023-11-06 RX ORDER — OSELTAMIVIR PHOSPHATE 75 MG/1
75 CAPSULE ORAL ONCE
Status: COMPLETED | OUTPATIENT
Start: 2023-11-06 | End: 2023-11-06

## 2023-11-06 RX ORDER — PREDNISONE 20 MG/1
40 TABLET ORAL DAILY
Status: DISCONTINUED | OUTPATIENT
Start: 2023-11-08 | End: 2023-11-08 | Stop reason: HOSPADM

## 2023-11-06 RX ORDER — ONDANSETRON 4 MG/1
4 TABLET, ORALLY DISINTEGRATING ORAL EVERY 8 HOURS PRN
Status: DISCONTINUED | OUTPATIENT
Start: 2023-11-06 | End: 2023-11-08 | Stop reason: HOSPADM

## 2023-11-06 RX ORDER — IPRATROPIUM BROMIDE AND ALBUTEROL SULFATE 2.5; .5 MG/3ML; MG/3ML
SOLUTION RESPIRATORY (INHALATION)
Status: DISPENSED
Start: 2023-11-06 | End: 2023-11-06

## 2023-11-06 RX ORDER — MONTELUKAST SODIUM 10 MG/1
10 TABLET ORAL DAILY
Status: DISCONTINUED | OUTPATIENT
Start: 2023-11-06 | End: 2023-11-08 | Stop reason: HOSPADM

## 2023-11-06 RX ORDER — ACETAMINOPHEN 325 MG/1
650 TABLET ORAL EVERY 6 HOURS PRN
Status: DISCONTINUED | OUTPATIENT
Start: 2023-11-06 | End: 2023-11-08 | Stop reason: HOSPADM

## 2023-11-06 RX ORDER — BENRALIZUMAB 30 MG/ML
30 INJECTION, SOLUTION SUBCUTANEOUS SEE ADMIN INSTRUCTIONS
COMMUNITY

## 2023-11-06 RX ADMIN — IPRATROPIUM BROMIDE AND ALBUTEROL SULFATE 1 DOSE: .5; 3 SOLUTION RESPIRATORY (INHALATION) at 16:00

## 2023-11-06 RX ADMIN — IPRATROPIUM BROMIDE AND ALBUTEROL SULFATE 1 DOSE: .5; 3 SOLUTION RESPIRATORY (INHALATION) at 15:59

## 2023-11-06 RX ADMIN — POTASSIUM CHLORIDE 10 MEQ: 7.46 INJECTION, SOLUTION INTRAVENOUS at 12:56

## 2023-11-06 RX ADMIN — METHYLPREDNISOLONE SODIUM SUCCINATE 60 MG: 125 INJECTION, POWDER, FOR SOLUTION INTRAMUSCULAR; INTRAVENOUS at 19:05

## 2023-11-06 RX ADMIN — POTASSIUM CHLORIDE 10 MEQ: 7.46 INJECTION, SOLUTION INTRAVENOUS at 14:33

## 2023-11-06 RX ADMIN — POTASSIUM CHLORIDE 10 MEQ: 7.46 INJECTION, SOLUTION INTRAVENOUS at 17:21

## 2023-11-06 RX ADMIN — IOPAMIDOL 100 ML: 755 INJECTION, SOLUTION INTRAVENOUS at 21:37

## 2023-11-06 RX ADMIN — POTASSIUM CHLORIDE 10 MEQ: 7.46 INJECTION, SOLUTION INTRAVENOUS at 19:11

## 2023-11-06 RX ADMIN — MONTELUKAST 10 MG: 10 TABLET, FILM COATED ORAL at 20:05

## 2023-11-06 RX ADMIN — METHYLPREDNISOLONE SODIUM SUCCINATE 125 MG: 125 INJECTION, POWDER, FOR SOLUTION INTRAMUSCULAR; INTRAVENOUS at 10:44

## 2023-11-06 RX ADMIN — LEVOFLOXACIN 500 MG: 500 TABLET, FILM COATED ORAL at 19:04

## 2023-11-06 RX ADMIN — ACETAMINOPHEN 650 MG: 325 TABLET ORAL at 20:05

## 2023-11-06 RX ADMIN — BUDESONIDE INHALATION 500 MCG: 0.5 SUSPENSION RESPIRATORY (INHALATION) at 20:30

## 2023-11-06 RX ADMIN — IPRATROPIUM BROMIDE AND ALBUTEROL SULFATE 1 DOSE: .5; 3 SOLUTION RESPIRATORY (INHALATION) at 20:30

## 2023-11-06 RX ADMIN — ONDANSETRON HYDROCHLORIDE 4 MG: 2 INJECTION INTRAMUSCULAR; INTRAVENOUS at 21:57

## 2023-11-06 RX ADMIN — ARFORMOTEROL TARTRATE 15 MCG: 15 SOLUTION RESPIRATORY (INHALATION) at 20:30

## 2023-11-06 RX ADMIN — CETIRIZINE HYDROCHLORIDE 10 MG: 10 TABLET, FILM COATED ORAL at 20:05

## 2023-11-06 RX ADMIN — IPRATROPIUM BROMIDE AND ALBUTEROL SULFATE 1 DOSE: .5; 3 SOLUTION RESPIRATORY (INHALATION) at 09:07

## 2023-11-06 RX ADMIN — ALBUTEROL SULFATE 10 MG: 2.5 SOLUTION RESPIRATORY (INHALATION) at 09:17

## 2023-11-06 RX ADMIN — IPRATROPIUM BROMIDE AND ALBUTEROL SULFATE 1 DOSE: .5; 3 SOLUTION RESPIRATORY (INHALATION) at 15:58

## 2023-11-06 RX ADMIN — MAGNESIUM SULFATE HEPTAHYDRATE 2000 MG: 40 INJECTION, SOLUTION INTRAVENOUS at 10:54

## 2023-11-06 RX ADMIN — ENOXAPARIN SODIUM 30 MG: 100 INJECTION SUBCUTANEOUS at 21:58

## 2023-11-06 RX ADMIN — SODIUM CHLORIDE 1000 ML: 9 INJECTION, SOLUTION INTRAVENOUS at 10:24

## 2023-11-06 RX ADMIN — GUAIFENESIN 600 MG: 600 TABLET ORAL at 20:05

## 2023-11-06 RX ADMIN — KETOROLAC TROMETHAMINE 30 MG: 30 INJECTION, SOLUTION INTRAMUSCULAR; INTRAVENOUS at 22:45

## 2023-11-06 RX ADMIN — BUSPIRONE HYDROCHLORIDE 5 MG: 5 TABLET ORAL at 20:05

## 2023-11-06 RX ADMIN — POTASSIUM CHLORIDE 40 MEQ: 750 TABLET, EXTENDED RELEASE ORAL at 12:34

## 2023-11-06 RX ADMIN — SODIUM CHLORIDE, PRESERVATIVE FREE 10 ML: 5 INJECTION INTRAVENOUS at 22:46

## 2023-11-06 RX ADMIN — OSELTAMIVIR 75 MG: 75 CAPSULE ORAL at 19:08

## 2023-11-06 RX ADMIN — SODIUM CHLORIDE 1000 ML: 9 INJECTION, SOLUTION INTRAVENOUS at 17:20

## 2023-11-06 RX ADMIN — POTASSIUM CHLORIDE 10 MEQ: 7.46 INJECTION, SOLUTION INTRAVENOUS at 21:57

## 2023-11-06 RX ADMIN — BUPROPION HYDROCHLORIDE 150 MG: 150 TABLET, FILM COATED, EXTENDED RELEASE ORAL at 20:05

## 2023-11-06 ASSESSMENT — LIFESTYLE VARIABLES
HOW MANY STANDARD DRINKS CONTAINING ALCOHOL DO YOU HAVE ON A TYPICAL DAY: PATIENT DOES NOT DRINK
HOW OFTEN DO YOU HAVE A DRINK CONTAINING ALCOHOL: NEVER

## 2023-11-06 ASSESSMENT — PAIN DESCRIPTION - PAIN TYPE: TYPE: ACUTE PAIN

## 2023-11-06 ASSESSMENT — PAIN DESCRIPTION - DESCRIPTORS
DESCRIPTORS: OTHER (COMMENT)
DESCRIPTORS: SHARP

## 2023-11-06 ASSESSMENT — PAIN SCALES - GENERAL
PAINLEVEL_OUTOF10: 6
PAINLEVEL_OUTOF10: 8

## 2023-11-06 ASSESSMENT — PAIN DESCRIPTION - ORIENTATION
ORIENTATION: ANTERIOR;MID
ORIENTATION: MID

## 2023-11-06 ASSESSMENT — PAIN DESCRIPTION - LOCATION
LOCATION: HEAD
LOCATION: CHEST

## 2023-11-06 ASSESSMENT — PAIN - FUNCTIONAL ASSESSMENT: PAIN_FUNCTIONAL_ASSESSMENT: 0-10

## 2023-11-06 NOTE — ED NOTES
TRANSFER - OUT REPORT:    Verbal report given to Jj Cortez RN on Mono Mccullough  being transferred to [facility] Bed: 213/01 for routine progression of patient care       Report consisted of patient's Situation, Background, Assessment and   Recommendations(SBAR). Information from the following report(s) Nurse Handoff Report, ED Encounter Summary, ED SBAR, [facility], and Recent Results was reviewed with the receiving nurse. Onley Fall Assessment:    Presents to emergency department  because of falls (Syncope, seizure, or loss of consciousness): No  Age > 70: No  Altered Mental Status, Intoxication with alcohol or substance confusion (Disorientation, impaired judgment, poor safety awaremess, or inability to follow instructions): No  Impaired Mobility: Ambulates or transfers with assistive devices or assistance; Unable to ambulate or transer.: No  Nursing Judgement: No          Lines:   Peripheral IV 11/06/23 Left Antecubital (Active)   Site Assessment Clean, dry & intact 11/06/23 1023   Line Status Blood return noted;Normal saline locked;Specimen collected 11/06/23 1023   Phlebitis Assessment No symptoms 11/06/23 1023   Infiltration Assessment 0 11/06/23 1023   Dressing Status New dressing applied 11/06/23 1023   Dressing Type Transparent 11/06/23 1023   Dressing Intervention New 11/06/23 1023        Opportunity for questions and clarification was provided.       Patient transported with:  Registered Nurse - FARA Llanes Shona Rowan, RN  11/06/23 6487

## 2023-11-06 NOTE — ED PROVIDER NOTES
Lymphocytes Absolute 0.4 (*)     Absolute Immature Granulocyte 0.1 (*)     All other components within normal limits   COMPREHENSIVE METABOLIC PANEL W/ REFLEX TO MG FOR LOW K - Abnormal; Notable for the following components:    CO2 19 (*)     Glucose 166 (*)     Bun/Cre Ratio 10 (*)     Albumin 3.1 (*)     Globulin 4.1 (*)     Albumin/Globulin Ratio 0.8 (*)     All other components within normal limits   COVID-19 & INFLUENZA COMBO   CULTURE, RESPIRATORY   HCG, SERUM, QUALITATIVE   TROPONIN   PH, VENOUS          EKG: If performed, independent interpretation documented below in the MDM section     RADIOLOGY:  Non-plain film images such as CT, Ultrasound and MRI are read by the radiologist. Plain radiographic images are visualized and preliminarily interpreted by the ED Provider with the findings documented in the MDM section. Interpretation per the Radiologist below, if available at the time of this note:     [unfilled]   XR CHEST PORTABLE  CTA CHEST W WO CONTRAST  EKG 12-LEAD  EKG RHYTHM STRIP  EKG RHYTHM STRIP  EKG RHYTHM STRIP  EKG RHYTHM STRIP  EKG RHYTHM STRIP  EKG RHYTHM STRIP  EKG RHYTHM STRIP  EKG RHYTHM STRIP  EKG RHYTHM STRIP  EKG RHYTHM STRIP       PROCEDURES   Unless otherwise noted below, none  Procedures     CRITICAL CARE TIME   I have spent 123 minutes of critical care time in evaluating and treating this patient. This includes time spent at bedside, time with family and decision makers, documentation, review of labs and imaging, and/or consultation with specialists. It does not include time spent on separately billed procedures. This patient presents with a critical illness or injury that acutely impairs one or more vital organ systems such that there is a high probability of imminent or life threatening deterioration in the patient's condition.   This case involved decision making of high complexity to assess, manipulate, and support vital organ system failure and/or to prevent further life

## 2023-11-06 NOTE — PROGRESS NOTES
1849) Message to Dr. Gillie Kocher, MEWs 4, 99.7, 139, 30, 100%, 126/71  1850) Message to Dr. Gillie Kocher, Steve, pt chest pain with cough 8/10. Mucus brown per pt. Incentive spirometer and flutter valve?

## 2023-11-06 NOTE — PROGRESS NOTES
.. TRANSFER - IN REPORT:    Verbal report received from FARA Earl on Cinthya Dunn  being received from ED for routine progression of patient care      Report consisted of patient's Situation, Background, Assessment and   Recommendations(SBAR). Information from the following report(s) Nurse Handoff Report, Index, MAR, Recent Results, and Cardiac Rhythm ST  was reviewed with the receiving nurse. Opportunity for questions and clarification was provided. Assessment completed upon patient's arrival to unit and care assumed.

## 2023-11-06 NOTE — PROGRESS NOTES
@1825 PT WAS NOT READY TO COME TO CT, NURSE STATED PT WILL BE TAKEN UPSTAIRS, NURSE UPSTAIRS WILL CALL WHEN PT IS READY FOR CT

## 2023-11-06 NOTE — H&P
History and Physical    Date of Service:  11/6/2023  Primary Care Provider: ZOLTAN Ariza NP  Source of information: Pt/Documents/ED physician. Chief Complaint: Asthma      History of Presenting Illness:   Tona Harden is a 29 y.o. female past medical history of asthma(followed by pulmonologist), Allergy disorder(On allergy shots at [facility] every 15 days), anxiety/depression, COVID infection(2022)who started having worsening productive cough with yellowish phlegm for last 1 week however developed worsening SOB and wheezing since last night associated with chest tightness. Pt used her nebulizer however it did not help much and patient presented to the emergency room. In emergency room patient was given bronchodilators however still continued to have significant symptoms of shortness of breath and wheezing. Also had influenza A positive. Started on Tamiflu. Medical team was called to admit patient for further evaluation and management. The patient denies any blurry vision, sore throat, trouble swallowing, trouble with speech, fever, chills, N/V/D, abd pain, urinary symptoms, constipation, recent travels, sick contacts, focal or generalized neurological symptoms, falls, injuries, rashes, contact with COVID-19 diagnosed patients, hematemesis, melena, hemoptysis, hematuria, rashes, denies starting any new medications and denies any other concerns or problems besides as mentioned above. REVIEW OF SYSTEMS:  A comprehensive review of systems was negative except for that written in the History of Present Illness.      Past Medical History:   Diagnosis Date    Anxiety     Asthma     Depression     Ill-defined condition     Headache    Nausea & vomiting       Past Surgical History:   Procedure Laterality Date    GI      Choleycystectomy    GYN      3 C-sections    MT UNLISTED PROCEDURE ABDOMEN PERITONEUM & OMENTUM  2014    Ventral Hernia Repair     Prior to Admission medications    Medication

## 2023-11-06 NOTE — PROGRESS NOTES
[address] Admission Pharmacy Medication Reconciliation    Information obtained from:Patient  RxQuery data available1:Yes    Comments/recommendations:  Patient was able to list her medications using her pharmacy eddi  Patient reports buspirone is prescribed twice daily, but that she takes it once daily  Patient reports taking prednisone as needed based on instructions from her pulmonologist  Patient reports she was due for [place] injection on 10/2/23 at [facility], but was unable to make the appointment    Medication changes (since last review): Added  Quetiapine  Breo Ellipta  Buspirone  Fasenra  Removed  Duloxetine  Hydrocortisone cream  Potassium  trazodone  Adjusted  Prednisone to 10 mg daily as needed per pulmonologist instructions   1RxQuery pharmacy benefit data reflects medications filled and processed through the patient's insurance, however this data does NOT capture whether the medication was picked up or is currently being taken by the patient. Patient allergies:    Allergies as of 11/06/2023 - Fully Reviewed 11/06/2023   Allergen Reaction Noted    Hydromorphone Hives and Swelling 02/22/2016    Morphine Hives and Swelling 02/22/2016    Azithromycin Rash 02/22/2016         Prior to Admission Medications   Prescriptions Last Dose Informant   QUEtiapine (SEROQUEL) 50 MG tablet  Self   Sig: Take 1 tablet by mouth nightly as needed (sleep)   albuterol sulfate HFA (PROVENTIL;VENTOLIN;PROAIR) 108 (90 Base) MCG/ACT inhaler  Self   Sig: Inhale 1 puff into the lungs every 4 hours as needed for Wheezing   benralizumab (FASENRA) 30 MG/ML SOSY injection 8/8/23 Self   Sig: Inject 1 mL into the skin See Admin Instructions Every 58 days at [facility]   buPROPion (WELLBUTRIN XL) 150 MG extended release tablet  Self   Sig: Take 1 tablet by mouth daily   busPIRone (BUSPAR) 5 MG tablet  Self   Sig: Take 1 tablet by mouth daily   fluticasone furoate-vilanterol (BREO ELLIPTA) 200-25 MCG/ACT AEPB inhaler  Self   Sig: Inhale 1 puff into the

## 2023-11-06 NOTE — DISCHARGE INSTRUCTIONS
You were evaluated in the emergency department for an asthma exacerbation. Your examination was reassuring as was your work-up including blood work and chest x-ray. Your potassium levels were low at 2.7, this was replaced in the ED, please take potassium replacements for the next few days. .  It will be important for you to follow-up with your primary care physician in 2-3 days. If you develop worsening symptoms such as worsening shortness of breath, chest pain, fevers, passing out episodes, or dehydration, please return to the emergency department immediately.

## 2023-11-07 LAB
ALBUMIN SERPL-MCNC: 3.1 G/DL (ref 3.5–5)
ALBUMIN/GLOB SERPL: 0.8 (ref 1.1–2.2)
ALP SERPL-CCNC: 97 U/L (ref 45–117)
ALT SERPL-CCNC: 67 U/L (ref 12–78)
ANION GAP SERPL CALC-SCNC: 13 MMOL/L (ref 5–15)
ANION GAP SERPL CALC-SCNC: 13 MMOL/L (ref 5–15)
AST SERPL-CCNC: 33 U/L (ref 15–37)
BASOPHILS # BLD: 0 K/UL (ref 0–0.1)
BASOPHILS NFR BLD: 0 % (ref 0–1)
BILIRUB SERPL-MCNC: 0.3 MG/DL (ref 0.2–1)
BUN SERPL-MCNC: 8 MG/DL (ref 6–20)
BUN SERPL-MCNC: 8 MG/DL (ref 6–20)
BUN/CREAT SERPL: 10 (ref 12–20)
BUN/CREAT SERPL: 11 (ref 12–20)
CALCIUM SERPL-MCNC: 8.5 MG/DL (ref 8.5–10.1)
CALCIUM SERPL-MCNC: 8.5 MG/DL (ref 8.5–10.1)
CHLORIDE SERPL-SCNC: 105 MMOL/L (ref 97–108)
CHLORIDE SERPL-SCNC: 106 MMOL/L (ref 97–108)
CO2 SERPL-SCNC: 19 MMOL/L (ref 21–32)
CO2 SERPL-SCNC: 19 MMOL/L (ref 21–32)
CREAT SERPL-MCNC: 0.74 MG/DL (ref 0.55–1.02)
CREAT SERPL-MCNC: 0.8 MG/DL (ref 0.55–1.02)
DIFFERENTIAL METHOD BLD: ABNORMAL
EKG ATRIAL RATE: 125 BPM
EKG DIAGNOSIS: NORMAL
EKG P AXIS: 47 DEGREES
EKG P-R INTERVAL: 158 MS
EKG Q-T INTERVAL: 302 MS
EKG QRS DURATION: 74 MS
EKG QTC CALCULATION (BAZETT): 435 MS
EKG R AXIS: 30 DEGREES
EKG T AXIS: 34 DEGREES
EKG VENTRICULAR RATE: 125 BPM
EOSINOPHIL # BLD: 0 K/UL (ref 0–0.4)
EOSINOPHIL NFR BLD: 0 % (ref 0–7)
ERYTHROCYTE [DISTWIDTH] IN BLOOD BY AUTOMATED COUNT: 14.2 % (ref 11.5–14.5)
GLOBULIN SER CALC-MCNC: 4.1 G/DL (ref 2–4)
GLUCOSE SERPL-MCNC: 166 MG/DL (ref 65–100)
GLUCOSE SERPL-MCNC: 166 MG/DL (ref 65–100)
HCT VFR BLD AUTO: 33.1 % (ref 35–47)
HGB BLD-MCNC: 10.7 G/DL (ref 11.5–16)
IMM GRANULOCYTES # BLD AUTO: 0.1 K/UL (ref 0–0.04)
IMM GRANULOCYTES NFR BLD AUTO: 1 % (ref 0–0.5)
LYMPHOCYTES # BLD: 0.4 K/UL (ref 0.8–3.5)
LYMPHOCYTES NFR BLD: 4 % (ref 12–49)
MCH RBC QN AUTO: 26.2 PG (ref 26–34)
MCHC RBC AUTO-ENTMCNC: 32.3 G/DL (ref 30–36.5)
MCV RBC AUTO: 81.1 FL (ref 80–99)
MONOCYTES # BLD: 0.1 K/UL (ref 0–1)
MONOCYTES NFR BLD: 1 % (ref 5–13)
NEUTS SEG # BLD: 9 K/UL (ref 1.8–8)
NEUTS SEG NFR BLD: 94 % (ref 32–75)
NRBC # BLD: 0 K/UL (ref 0–0.01)
NRBC BLD-RTO: 0 PER 100 WBC
PLATELET # BLD AUTO: 343 K/UL (ref 150–400)
PMV BLD AUTO: 9.5 FL (ref 8.9–12.9)
POTASSIUM SERPL-SCNC: 4 MMOL/L (ref 3.5–5.1)
POTASSIUM SERPL-SCNC: 4 MMOL/L (ref 3.5–5.1)
PROT SERPL-MCNC: 7.2 G/DL (ref 6.4–8.2)
RBC # BLD AUTO: 4.08 M/UL (ref 3.8–5.2)
RBC MORPH BLD: ABNORMAL
SODIUM SERPL-SCNC: 137 MMOL/L (ref 136–145)
SODIUM SERPL-SCNC: 138 MMOL/L (ref 136–145)
WBC # BLD AUTO: 9.6 K/UL (ref 3.6–11)

## 2023-11-07 PROCEDURE — 85025 COMPLETE CBC W/AUTO DIFF WBC: CPT

## 2023-11-07 PROCEDURE — 6360000002 HC RX W HCPCS: Performed by: INTERNAL MEDICINE

## 2023-11-07 PROCEDURE — 94760 N-INVAS EAR/PLS OXIMETRY 1: CPT

## 2023-11-07 PROCEDURE — 93010 ELECTROCARDIOGRAM REPORT: CPT | Performed by: SPECIALIST

## 2023-11-07 PROCEDURE — 2580000003 HC RX 258: Performed by: INTERNAL MEDICINE

## 2023-11-07 PROCEDURE — 6370000000 HC RX 637 (ALT 250 FOR IP): Performed by: INTERNAL MEDICINE

## 2023-11-07 PROCEDURE — 80053 COMPREHEN METABOLIC PANEL: CPT

## 2023-11-07 PROCEDURE — 36415 COLL VENOUS BLD VENIPUNCTURE: CPT

## 2023-11-07 PROCEDURE — 94640 AIRWAY INHALATION TREATMENT: CPT

## 2023-11-07 PROCEDURE — 1200000000 HC SEMI PRIVATE

## 2023-11-07 RX ADMIN — BUSPIRONE HYDROCHLORIDE 5 MG: 5 TABLET ORAL at 08:23

## 2023-11-07 RX ADMIN — SODIUM CHLORIDE, PRESERVATIVE FREE 10 ML: 5 INJECTION INTRAVENOUS at 16:09

## 2023-11-07 RX ADMIN — OSELTAMIVIR 75 MG: 75 CAPSULE ORAL at 21:26

## 2023-11-07 RX ADMIN — ACETAMINOPHEN 650 MG: 325 TABLET ORAL at 08:23

## 2023-11-07 RX ADMIN — ARFORMOTEROL TARTRATE 15 MCG: 15 SOLUTION RESPIRATORY (INHALATION) at 19:26

## 2023-11-07 RX ADMIN — GUAIFENESIN 600 MG: 600 TABLET ORAL at 08:23

## 2023-11-07 RX ADMIN — BUDESONIDE INHALATION 500 MCG: 0.5 SUSPENSION RESPIRATORY (INHALATION) at 07:20

## 2023-11-07 RX ADMIN — GUAIFENESIN 600 MG: 600 TABLET ORAL at 21:26

## 2023-11-07 RX ADMIN — OSELTAMIVIR 75 MG: 75 CAPSULE ORAL at 08:23

## 2023-11-07 RX ADMIN — ENOXAPARIN SODIUM 30 MG: 100 INJECTION SUBCUTANEOUS at 08:22

## 2023-11-07 RX ADMIN — LEVOFLOXACIN 500 MG: 500 TABLET, FILM COATED ORAL at 08:23

## 2023-11-07 RX ADMIN — BUDESONIDE INHALATION 500 MCG: 0.5 SUSPENSION RESPIRATORY (INHALATION) at 19:26

## 2023-11-07 RX ADMIN — METHYLPREDNISOLONE SODIUM SUCCINATE 60 MG: 125 INJECTION, POWDER, FOR SOLUTION INTRAMUSCULAR; INTRAVENOUS at 08:22

## 2023-11-07 RX ADMIN — SODIUM CHLORIDE, PRESERVATIVE FREE 10 ML: 5 INJECTION INTRAVENOUS at 08:22

## 2023-11-07 RX ADMIN — ENOXAPARIN SODIUM 30 MG: 100 INJECTION SUBCUTANEOUS at 21:26

## 2023-11-07 RX ADMIN — ALBUTEROL SULFATE 2.5 MG: 2.5 SOLUTION RESPIRATORY (INHALATION) at 11:06

## 2023-11-07 RX ADMIN — BUPROPION HYDROCHLORIDE 150 MG: 150 TABLET, FILM COATED, EXTENDED RELEASE ORAL at 08:23

## 2023-11-07 RX ADMIN — METHYLPREDNISOLONE SODIUM SUCCINATE 60 MG: 125 INJECTION, POWDER, FOR SOLUTION INTRAMUSCULAR; INTRAVENOUS at 16:09

## 2023-11-07 RX ADMIN — ARFORMOTEROL TARTRATE 15 MCG: 15 SOLUTION RESPIRATORY (INHALATION) at 07:19

## 2023-11-07 RX ADMIN — METHYLPREDNISOLONE SODIUM SUCCINATE 60 MG: 125 INJECTION, POWDER, FOR SOLUTION INTRAMUSCULAR; INTRAVENOUS at 00:07

## 2023-11-07 RX ADMIN — MONTELUKAST 10 MG: 10 TABLET, FILM COATED ORAL at 08:23

## 2023-11-07 RX ADMIN — CETIRIZINE HYDROCHLORIDE 10 MG: 10 TABLET, FILM COATED ORAL at 08:23

## 2023-11-07 RX ADMIN — SODIUM CHLORIDE, PRESERVATIVE FREE 5 ML: 5 INJECTION INTRAVENOUS at 21:26

## 2023-11-07 ASSESSMENT — PAIN SCALES - GENERAL
PAINLEVEL_OUTOF10: 6
PAINLEVEL_OUTOF10: 3
PAINLEVEL_OUTOF10: 0
PAINLEVEL_OUTOF10: 3

## 2023-11-07 ASSESSMENT — PAIN DESCRIPTION - LOCATION
LOCATION: HEAD
LOCATION: HEAD

## 2023-11-07 ASSESSMENT — PAIN DESCRIPTION - DESCRIPTORS: DESCRIPTORS: ACHING

## 2023-11-07 NOTE — PROGRESS NOTES
[address] [facility]  Hospitalist Group                                                                                          Hospitalist Progress Note  Dion Espitia MD  Office Phone: [phone]        Date of Service:  2023  NAME:  Zohaib Dalal  :  1990  MRN:  [id number]       Admission Summary:   Zohaib Dalal is a 29 y.o. female past medical history of asthma(followed by pulmonologist), Allergy disorder(On allergy shots at [facility] every 15 days), anxiety/depression, COVID infection()who started having worsening productive cough with yellowish phlegm for last 1 week however developed worsening SOB and wheezing since last night associated with chest tightness. Pt used her nebulizer however it did not help much and patient presented to the emergency room. In emergency room patient was given bronchodilators however still continued to have significant symptoms of shortness of breath and wheezing. Also had influenza A positive. Started on Tamiflu. Medical team was called to admit patient for further evaluation and management. The patient denies any blurry vision, sore throat, trouble swallowing, trouble with speech, fever, chills, N/V/D, abd pain, urinary symptoms, constipation, recent travels, sick contacts, focal or generalized neurological symptoms, falls, injuries, rashes, contact with COVID-19 diagnosed patients, hematemesis, melena, hemoptysis, hematuria, rashes, denies starting any new medications and denies any other concerns or problems besides as mentioned above. Interval history / Subjective:   Patient had no complaints, explained she is feeling much better, and approaching her home baseline. On RA saturating at 96%. Continues to have mild cough. Currently, patient denies headache, vision or hearing changes, fever, chills, weakness, chest pain,  abd pain, N,V LE edema, numbness or tingling in extremities.        Assessment & Plan:              #Acute asthma

## 2023-11-08 VITALS
DIASTOLIC BLOOD PRESSURE: 73 MMHG | TEMPERATURE: 98.5 F | RESPIRATION RATE: 16 BRPM | BODY MASS INDEX: 44.57 KG/M2 | HEART RATE: 109 BPM | WEIGHT: 227 LBS | HEIGHT: 60 IN | SYSTOLIC BLOOD PRESSURE: 131 MMHG | OXYGEN SATURATION: 100 %

## 2023-11-08 LAB
BACTERIA SPEC CULT: NORMAL
CHOLEST SERPL-MCNC: 182 MG/DL
EST. AVERAGE GLUCOSE BLD GHB EST-MCNC: 105 MG/DL
GRAM STN SPEC: NORMAL
HBA1C MFR BLD: 5.3 % (ref 4–5.6)
HDLC SERPL-MCNC: 62 MG/DL
HDLC SERPL: 2.9 (ref 0–5)
LDLC SERPL CALC-MCNC: 108.6 MG/DL (ref 0–100)
SERVICE CMNT-IMP: NORMAL
TRIGL SERPL-MCNC: 57 MG/DL
VLDLC SERPL CALC-MCNC: 11.4 MG/DL

## 2023-11-08 PROCEDURE — 6370000000 HC RX 637 (ALT 250 FOR IP): Performed by: INTERNAL MEDICINE

## 2023-11-08 PROCEDURE — 6360000002 HC RX W HCPCS: Performed by: INTERNAL MEDICINE

## 2023-11-08 PROCEDURE — 94640 AIRWAY INHALATION TREATMENT: CPT

## 2023-11-08 PROCEDURE — 83036 HEMOGLOBIN GLYCOSYLATED A1C: CPT

## 2023-11-08 PROCEDURE — 2580000003 HC RX 258: Performed by: INTERNAL MEDICINE

## 2023-11-08 PROCEDURE — 36415 COLL VENOUS BLD VENIPUNCTURE: CPT

## 2023-11-08 PROCEDURE — 80061 LIPID PANEL: CPT

## 2023-11-08 PROCEDURE — 6370000000 HC RX 637 (ALT 250 FOR IP)

## 2023-11-08 RX ORDER — MONTELUKAST SODIUM 10 MG/1
10 TABLET ORAL DAILY
Qty: 30 TABLET | Refills: 0 | Status: SHIPPED | OUTPATIENT
Start: 2023-11-08 | End: 2023-12-08

## 2023-11-08 RX ORDER — IPRATROPIUM BROMIDE AND ALBUTEROL SULFATE 2.5; .5 MG/3ML; MG/3ML
3 SOLUTION RESPIRATORY (INHALATION) EVERY 4 HOURS PRN
Qty: 360 ML | Refills: 0 | Status: SHIPPED | OUTPATIENT
Start: 2023-11-08

## 2023-11-08 RX ORDER — FLUTICASONE FUROATE AND VILANTEROL 200; 25 UG/1; UG/1
1 POWDER RESPIRATORY (INHALATION) DAILY
Qty: 1 EACH | Refills: 0 | Status: SHIPPED | OUTPATIENT
Start: 2023-11-08

## 2023-11-08 RX ORDER — PREDNISONE 20 MG/1
40 TABLET ORAL DAILY
Qty: 10 TABLET | Refills: 0 | Status: SHIPPED | OUTPATIENT
Start: 2023-11-08 | End: 2023-11-13

## 2023-11-08 RX ORDER — GUAIFENESIN 600 MG/1
600 TABLET, EXTENDED RELEASE ORAL 2 TIMES DAILY
Qty: 60 TABLET | Refills: 0 | Status: SHIPPED | OUTPATIENT
Start: 2023-11-08 | End: 2023-12-08

## 2023-11-08 RX ORDER — BENZONATATE 100 MG/1
100 CAPSULE ORAL 3 TIMES DAILY PRN
Qty: 21 CAPSULE | Refills: 0 | Status: SHIPPED | OUTPATIENT
Start: 2023-11-08 | End: 2023-11-15

## 2023-11-08 RX ORDER — IPRATROPIUM BROMIDE AND ALBUTEROL SULFATE 2.5; .5 MG/3ML; MG/3ML
SOLUTION RESPIRATORY (INHALATION)
Status: COMPLETED
Start: 2023-11-08 | End: 2023-11-08

## 2023-11-08 RX ORDER — QUETIAPINE FUMARATE 50 MG/1
50 TABLET, FILM COATED ORAL NIGHTLY PRN
Qty: 30 TABLET | Refills: 0 | Status: SHIPPED | OUTPATIENT
Start: 2023-11-08 | End: 2023-12-08

## 2023-11-08 RX ORDER — BUSPIRONE HYDROCHLORIDE 5 MG/1
5 TABLET ORAL DAILY
Qty: 30 TABLET | Refills: 0 | Status: SHIPPED | OUTPATIENT
Start: 2023-11-08 | End: 2023-12-08

## 2023-11-08 RX ORDER — LEVOFLOXACIN 500 MG/1
500 TABLET, FILM COATED ORAL DAILY
Qty: 5 TABLET | Refills: 0 | Status: SHIPPED | OUTPATIENT
Start: 2023-11-09 | End: 2023-11-14

## 2023-11-08 RX ORDER — BUPROPION HYDROCHLORIDE 150 MG/1
150 TABLET ORAL DAILY
Qty: 30 TABLET | Refills: 0 | Status: SHIPPED | OUTPATIENT
Start: 2023-11-09

## 2023-11-08 RX ORDER — ALBUTEROL SULFATE 90 UG/1
2 AEROSOL, METERED RESPIRATORY (INHALATION) EVERY 4 HOURS PRN
Qty: 18 G | Refills: 0 | Status: SHIPPED | OUTPATIENT
Start: 2023-11-08

## 2023-11-08 RX ORDER — OSELTAMIVIR PHOSPHATE 75 MG/1
75 CAPSULE ORAL 2 TIMES DAILY
Qty: 6 CAPSULE | Refills: 0 | Status: SHIPPED | OUTPATIENT
Start: 2023-11-08 | End: 2023-11-11

## 2023-11-08 RX ADMIN — BUDESONIDE INHALATION 500 MCG: 0.5 SUSPENSION RESPIRATORY (INHALATION) at 08:08

## 2023-11-08 RX ADMIN — METHYLPREDNISOLONE SODIUM SUCCINATE 60 MG: 125 INJECTION, POWDER, FOR SOLUTION INTRAMUSCULAR; INTRAVENOUS at 00:22

## 2023-11-08 RX ADMIN — LEVOFLOXACIN 500 MG: 500 TABLET, FILM COATED ORAL at 09:32

## 2023-11-08 RX ADMIN — GUAIFENESIN 600 MG: 600 TABLET ORAL at 09:33

## 2023-11-08 RX ADMIN — BUPROPION HYDROCHLORIDE 150 MG: 150 TABLET, FILM COATED, EXTENDED RELEASE ORAL at 09:33

## 2023-11-08 RX ADMIN — IPRATROPIUM BROMIDE AND ALBUTEROL SULFATE 3 ML: .5; 3 SOLUTION RESPIRATORY (INHALATION) at 12:53

## 2023-11-08 RX ADMIN — SODIUM CHLORIDE, PRESERVATIVE FREE 10 ML: 5 INJECTION INTRAVENOUS at 09:32

## 2023-11-08 RX ADMIN — MONTELUKAST 10 MG: 10 TABLET, FILM COATED ORAL at 09:33

## 2023-11-08 RX ADMIN — OSELTAMIVIR 75 MG: 75 CAPSULE ORAL at 09:33

## 2023-11-08 RX ADMIN — ARFORMOTEROL TARTRATE 15 MCG: 15 SOLUTION RESPIRATORY (INHALATION) at 08:08

## 2023-11-08 RX ADMIN — CETIRIZINE HYDROCHLORIDE 10 MG: 10 TABLET, FILM COATED ORAL at 09:33

## 2023-11-08 RX ADMIN — IPRATROPIUM BROMIDE AND ALBUTEROL SULFATE 1 DOSE: .5; 3 SOLUTION RESPIRATORY (INHALATION) at 08:07

## 2023-11-08 RX ADMIN — ENOXAPARIN SODIUM 30 MG: 100 INJECTION SUBCUTANEOUS at 09:32

## 2023-11-08 RX ADMIN — BUSPIRONE HYDROCHLORIDE 5 MG: 5 TABLET ORAL at 09:33

## 2023-11-08 RX ADMIN — METHYLPREDNISOLONE SODIUM SUCCINATE 60 MG: 125 INJECTION, POWDER, FOR SOLUTION INTRAMUSCULAR; INTRAVENOUS at 09:32

## 2023-11-08 ASSESSMENT — PAIN DESCRIPTION - LOCATION: LOCATION: RIB CAGE;STERNUM

## 2023-11-08 ASSESSMENT — PAIN DESCRIPTION - DESCRIPTORS: DESCRIPTORS: SORE

## 2023-11-08 ASSESSMENT — PAIN SCALES - GENERAL: PAINLEVEL_OUTOF10: 6

## 2023-11-08 NOTE — PROGRESS NOTES
[address] [facility]  Hospitalist Group                                                                                          Hospitalist Progress Note  Keo Feng MD  Office Phone: [phone]        Date of Service:  2023  NAME:  Raúl Lozoya  :  1990  MRN:  [id number]       Admission Summary:   Raúl Lozoya is a 29 y.o. female past medical history of asthma(followed by pulmonologist), Allergy disorder(On allergy shots at [facility] every 15 days), anxiety/depression, COVID infection()who started having worsening productive cough with yellowish phlegm for last 1 week however developed worsening SOB and wheezing since last night associated with chest tightness. Pt used her nebulizer however it did not help much and patient presented to the emergency room. In emergency room patient was given bronchodilators however still continued to have significant symptoms of shortness of breath and wheezing. Also had influenza A positive. Started on Tamiflu. Medical team was called to admit patient for further evaluation and management. The patient denies any blurry vision, sore throat, trouble swallowing, trouble with speech, fever, chills, N/V/D, abd pain, urinary symptoms, constipation, recent travels, sick contacts, focal or generalized neurological symptoms, falls, injuries, rashes, contact with COVID-19 diagnosed patients, hematemesis, melena, hemoptysis, hematuria, rashes, denies starting any new medications and denies any other concerns or problems besides as mentioned above. Interval history / Subjective:   Patient had no complaints, explained she is feeling much better, and approaching her home baseline. On RA saturating at 96%. Currently, patient denies headache, vision or hearing changes, fever, chills, weakness, chest pain,  abd pain, N,V LE edema, numbness or tingling in extremities.      Stable for discharge  Assessment & Plan:              #Acute asthma

## 2023-11-08 NOTE — PROGRESS NOTES
1914) Bedside and Verbal shift change report given to [address] [place] [address] (oncoming nurse) by Pretty Garza (offgoing nurse). Report included the following information SBAR, Kardex, Intake/Output, MAR and Recent Results. 2035) PM assessment done. Patient is alert   and oriented x 4. VS taken. Patient has 20 g left AC. Line flushed and recapped. Patient is awake and on her phone. No additional needs at this time. Call light within reach. 2126) Scheduled bedtime medications given. Snacks given per patient's request.     Simeon Solis) Patient was reassessed. Vital signs were taken. No new changes. 0022) Scheduled Solu-medrol given. 4432) Patient was reassessed. Vital signs were taken. No new changes. 3947) Blood works collected for AM labs and sent to the lab.    3050) Lab results still pending. 1963) Bedside and Verbal shift change report given to Pretty Garza (oncoming nurse) by [address] [place] [address] (offgoing nurse). Report included the following information SBAR, Kardex, Intake/Output, MAR and Recent Results.

## 2023-11-08 NOTE — CARE COORDINATION
TRAVIS  RUR: 8%    Attempted to call patient pcp to get follow up. Was on hold for 15+ minutes with no answer. Will re-attempt tomorrow AM to get follow up. JONELLE called patient into room and asked if she is able to call her pulmonology clinic at [facility] to schedule a follow up. She states she already has an appt with them for 11/16 at 10:45am. She will also mention to them about getting a new nebulizer.      CLARISA De La Fuente, RN, 
Transition of Care Plan:    RUR: 8%  Prior Level of Functioning: Independent  Disposition: Home with family  If SNF or IPR: Date FOC offered: N/A  Follow up appointments: Follow up with Dr. Jeanine Forman DO  Thursday Nov 9, 2023  Pulmonology appointment scheduled for 11/9 at 10:45 AM. This is a virtual appointment. Please speak with your provider regarding a new nebulizer machine. [address] [address]  [phone]          Follow up with [facility] All[facility]  Thursday Nov 16, 2023  Allergy shot scheduled for 11/16 at 12:30 PM.           Follow up with [address] [place] [address]  Tuesday Feb 6, 2024  Primary care appointment scheduled for 2/6/2024 at 9:30 AM. If you would like a sooner available appointment with another provider, please call to schedule. Phone: [phone] [address] [facility], [address] [facility]   DME needed: Nebulizer (patient to follow up with pulmonologist about this). Transportation at discharge: Self  IM/IMM Medicare/ letter given: N/A  Is patient a  and connected with VA? No   If yes, was [employer] transfer form completed and VA notified? N/A  Caregiver Contact: Self  Discharge Caregiver contacted prior to discharge? N/A  Care Conference needed? No  Barriers to discharge:  None    CM called [facility] to confirm pt's pulmonology appointment on 11/9 at 10:45 AM.     CM also called [facility] and scheduled pt's PCP appt for 2/6/2024 (this was the soonest available appt with current provider). CM added information to pt's AVS (as listed above). Pt ready for discharge.     Francisco Morales, [address] ,   [phone]
Confirm Follow Up Transport Self       Advance Care Planning     General Advance Care Planning (ACP) Conversation    Date of Conversation: 11/6/2023  Conducted with: Patient with Decision Making Capacity    Healthcare Decision Maker:  No healthcare decision makers have been documented. Click here to complete [address] including selection of the Healthcare Decision Maker Relationship (ie \"Primary\")   Today we documented Decision Maker(s) consistent with Legal Next of Kin hierarchy. Length of Voluntary ACP Conversation in minutes:  <16 minutes (Non-Billable)      PLAN OF CARE:  Patient will need pcp follow up  Patient will need pulmonology follow up. She sees a pulmonologist at [address] [place] [address].                Rich Martinez  11/7/2023

## 2023-11-08 NOTE — PROGRESS NOTES
1330: Pt was cleared for discharge by the team. Her vitals are stable and she has no acute issues remaining. Pt was advised of medication changes and follow-up appointments, as well as counseled on what symptoms would precipitate a need to return to the ED. Discharge packet was gone over with patient and her family member. All questions answered. Packet given to patient. All belongings packed up, tele monitor removed and PIV removed with no complications. RN wheeled patient down to ED entrance and assisted her into family member's vehicle.

## 2023-11-09 NOTE — DISCHARGE SUMMARY
Discharge Summary   Please note that this dictation was completed with [email or web address], the computer voice recognition software. Quite often unanticipated grammatical, syntax, homophones, and other interpretive errors are inadvertently transcribed by the computer software. Please disregard these errors. Please excuse any errors that have escaped final proofreading. PATIENT ID: Tommy Joseph  MRN: [id number]   YOB: 1990    DATE OF ADMISSION: 11/6/2023  8:48 AM    DATE OF DISCHARGE: 11/8/2023  PRIMARY CARE PROVIDER: ZOLTAN Mortensen NP         ATTENDING PHYSICIAN: Monet Knox MD  DISCHARGING PROVIDER: Monet Knox MD       CONSULTATIONS: None    PROCEDURES/SURGERIES: * No surgery found *    ADMITTING HPI from excerpted H&P   Tommy Joseph is a 29 y.o. female past medical history of asthma(followed by pulmonologist), Allergy disorder(On allergy shots at [facility] every 15 days), anxiety/depression, COVID infection(2022)who started having worsening productive cough with yellowish phlegm for last 1 week however developed worsening SOB and wheezing since last night associated with chest tightness. Pt used her nebulizer however it did not help much and patient presented to the emergency room. In emergency room patient was given bronchodilators however still continued to have significant symptoms of shortness of breath and wheezing. Also had influenza A positive. Started on Tamiflu. Medical team was called to admit patient for further evaluation and management. HOSPITAL COURSE & DISCHARGE DIAGNOSIS/ PLAN:     Review of female past with history of asthma, anxiety, depression, morbid obesity presented to the [facility] due to shortness breath and productive cough. Chest x-ray showed no acute pathology. ECG with sinus tachycardia. Sputum cultures and urine cultures negative however influenza A positive, patient given Tamiflu.   Patient received steroids and breathing treatment
outpatient. Patient was admitted for asthma exacerbation and received breathing treatments along with antibiotics and steroids. Influenza A positive given Tamiflu . Over time, patient respiratory status stabilized and now is on room air saturating 98% with no complaints of dyspnea. Tachycardia is pronounced after receiving albuterol. Patient to follow-up with PCP, pulmonology. Recommend PCP to order an echo to evaluate for possible pulmonary hypertension. PENDING TEST RESULTS:   At the time of discharge the following test results are still pending: none    [facility] AP[facility]MENTS:    [facility]     ADDITIONAL CARE RECOMMENDATIONS: Patient to follow-up with PCP, pulmonology. Recommend PCP to order an echo to evaluate for possible pulmonary hypertension.      DIET: regular    ACTIVITY: activity as tolerated    WOUND CARE: None    EQUIPMENT needed: none, has nebulizer at home      DISCHARGE MEDICATIONS:     Medication List        START taking these medications      potassium chloride 20 MEQ extended release tablet  Commonly known as: KLOR-CON M  Take 1 tablet by mouth 2 times daily for 5 days            CHANGE how you take these medications      albuterol sulfate  (90 Base) MCG/ACT inhaler  Commonly known as: PROVENTIL;VENTOLIN;PROAIR  Inhale 1 puff into the lungs every 4 hours as needed for Wheezing  What changed: reasons to take this            STOP taking these medications      DULoxetine 60 MG extended release capsule  Commonly known as: CYMBALTA     hydrocortisone 2.5 % cream     traZODone 100 MG tablet  Commonly known as: DESYREL            ASK your doctor about these medications      Breo Ellipta 200-25 MCG/ACT Aepb inhaler  Generic drug: fluticasone furoate-vilanterol     buPROPion 150 MG extended release tablet  Commonly known as: WELLBUTRIN XL     busPIRone 5 MG tablet  Commonly known as: BUSPAR     Fasenra 30 MG/ML Sosy injection  Generic drug: benralizumab     ipratropium 0.5

## 2023-11-10 ENCOUNTER — FOLLOWUP TELEPHONE ENCOUNTER (OUTPATIENT)
Facility: HOSPITAL | Age: 33
End: 2023-11-10

## 2023-11-10 NOTE — TELEPHONE ENCOUNTER
CM called patient by telephone to perform post discharge assessment and for the purpose of follow up call from inpatient discharge to check on environmental challenges/medications/appointment follow up/and questions/concerns. CM attempt to reach patient the individual states they do not speak english. Encounter closed.     [address] [facility]   [phone]

## 2023-11-13 RX ORDER — TRAZODONE HYDROCHLORIDE 100 MG/1
100 TABLET ORAL
Qty: 30 TABLET | Refills: 1 | Status: SHIPPED | OUTPATIENT
Start: 2023-11-13

## 2024-02-08 ENCOUNTER — APPOINTMENT (RX ONLY)
Dept: URBAN - METROPOLITAN AREA CLINIC 122 | Facility: CLINIC | Age: 34
Setting detail: DERMATOLOGY
End: 2024-02-08

## 2024-02-08 DIAGNOSIS — L82.1 OTHER SEBORRHEIC KERATOSIS: ICD-10-CM

## 2024-02-08 DIAGNOSIS — Z85.828 PERSONAL HISTORY OF OTHER MALIGNANT NEOPLASM OF SKIN: ICD-10-CM

## 2024-02-08 DIAGNOSIS — Z80.8 FAMILY HISTORY OF MALIGNANT NEOPLASM OF OTHER ORGANS OR SYSTEMS: ICD-10-CM

## 2024-02-08 DIAGNOSIS — D18.0 HEMANGIOMA: ICD-10-CM

## 2024-02-08 DIAGNOSIS — Z71.89 OTHER SPECIFIED COUNSELING: ICD-10-CM

## 2024-02-08 DIAGNOSIS — D22 MELANOCYTIC NEVI: ICD-10-CM

## 2024-02-08 DIAGNOSIS — Z87.2 PERSONAL HISTORY OF DISEASES OF THE SKIN AND SUBCUTANEOUS TISSUE: ICD-10-CM

## 2024-02-08 DIAGNOSIS — L81.4 OTHER MELANIN HYPERPIGMENTATION: ICD-10-CM

## 2024-02-08 DIAGNOSIS — L81.2 FRECKLES: ICD-10-CM

## 2024-02-08 PROBLEM — D22.5 MELANOCYTIC NEVI OF TRUNK: Status: ACTIVE | Noted: 2024-02-08

## 2024-02-08 PROBLEM — D18.01 HEMANGIOMA OF SKIN AND SUBCUTANEOUS TISSUE: Status: ACTIVE | Noted: 2024-02-08

## 2024-02-08 PROBLEM — D22.61 MELANOCYTIC NEVI OF RIGHT UPPER LIMB, INCLUDING SHOULDER: Status: ACTIVE | Noted: 2024-02-08

## 2024-02-08 PROBLEM — D22.39 MELANOCYTIC NEVI OF OTHER PARTS OF FACE: Status: ACTIVE | Noted: 2024-02-08

## 2024-02-08 PROBLEM — D22.72 MELANOCYTIC NEVI OF LEFT LOWER LIMB, INCLUDING HIP: Status: ACTIVE | Noted: 2024-02-08

## 2024-02-08 PROBLEM — D22.71 MELANOCYTIC NEVI OF RIGHT LOWER LIMB, INCLUDING HIP: Status: ACTIVE | Noted: 2024-02-08

## 2024-02-08 PROBLEM — D22.62 MELANOCYTIC NEVI OF LEFT UPPER LIMB, INCLUDING SHOULDER: Status: ACTIVE | Noted: 2024-02-08

## 2024-02-08 PROCEDURE — ? COUNSELING

## 2024-02-08 PROCEDURE — 99213 OFFICE O/P EST LOW 20 MIN: CPT

## 2024-02-08 ASSESSMENT — LOCATION DETAILED DESCRIPTION DERM
LOCATION DETAILED: LEFT BUTTOCK
LOCATION DETAILED: RIGHT DISTAL RADIAL DORSAL FOREARM
LOCATION DETAILED: SUPERIOR THORACIC SPINE
LOCATION DETAILED: LEFT POSTERIOR ANKLE
LOCATION DETAILED: RIGHT DISTAL PRETIBIAL REGION
LOCATION DETAILED: EPIGASTRIC SKIN
LOCATION DETAILED: INFERIOR MID FOREHEAD
LOCATION DETAILED: RIGHT SUPERIOR MEDIAL UPPER BACK
LOCATION DETAILED: RIGHT DORSAL FOOT
LOCATION DETAILED: UPPER STERNUM
LOCATION DETAILED: LEFT DISTAL PRETIBIAL REGION
LOCATION DETAILED: LEFT INFERIOR CENTRAL MALAR CHEEK
LOCATION DETAILED: LEFT DISTAL DORSAL FOREARM
LOCATION DETAILED: LEFT PROXIMAL DORSAL FOREARM
LOCATION DETAILED: RIGHT INFERIOR CENTRAL MALAR CHEEK
LOCATION DETAILED: RIGHT DISTAL DORSAL FOREARM
LOCATION DETAILED: LEFT DORSAL FOOT
LOCATION DETAILED: RIGHT CENTRAL MALAR CHEEK
LOCATION DETAILED: LEFT PROXIMAL PRETIBIAL REGION
LOCATION DETAILED: LEFT CENTRAL MALAR CHEEK
LOCATION DETAILED: RIGHT PROXIMAL DORSAL FOREARM
LOCATION DETAILED: MIDDLE STERNUM
LOCATION DETAILED: SUPERIOR MID FOREHEAD
LOCATION DETAILED: RIGHT DISTAL CALF

## 2024-02-08 ASSESSMENT — LOCATION ZONE DERM
LOCATION ZONE: FACE
LOCATION ZONE: ARM
LOCATION ZONE: TRUNK
LOCATION ZONE: FEET
LOCATION ZONE: LEG

## 2024-02-08 ASSESSMENT — LOCATION SIMPLE DESCRIPTION DERM
LOCATION SIMPLE: RIGHT FOOT
LOCATION SIMPLE: LEFT PRETIBIAL REGION
LOCATION SIMPLE: RIGHT CALF
LOCATION SIMPLE: LEFT CHEEK
LOCATION SIMPLE: CHEST
LOCATION SIMPLE: RIGHT UPPER BACK
LOCATION SIMPLE: RIGHT CHEEK
LOCATION SIMPLE: LEFT FOOT
LOCATION SIMPLE: RIGHT FOREARM
LOCATION SIMPLE: ABDOMEN
LOCATION SIMPLE: SUPERIOR FOREHEAD
LOCATION SIMPLE: RIGHT PRETIBIAL REGION
LOCATION SIMPLE: LEFT BUTTOCK
LOCATION SIMPLE: LEFT ANKLE
LOCATION SIMPLE: INFERIOR FOREHEAD
LOCATION SIMPLE: LEFT FOREARM
LOCATION SIMPLE: UPPER BACK

## 2024-02-08 NOTE — HPI: SKIN CHECK
What Is The Reason For Today's Visit?: the risk of recurrence, and the development of new lesions
Additional History: Patient reports itchy mid back, lesions on bilateral thighs, left leg.

## 2024-04-01 ENCOUNTER — HOSPITAL ENCOUNTER (EMERGENCY)
Facility: HOSPITAL | Age: 34
Discharge: HOME | End: 2024-04-01
Attending: EMERGENCY MEDICINE
Payer: COMMERCIAL

## 2024-04-01 ENCOUNTER — APPOINTMENT (OUTPATIENT)
Facility: HOSPITAL | Age: 34
End: 2024-04-01
Payer: COMMERCIAL

## 2024-04-01 VITALS
SYSTOLIC BLOOD PRESSURE: 112 MMHG | BODY MASS INDEX: 44.57 KG/M2 | RESPIRATION RATE: 17 BRPM | TEMPERATURE: 97.7 F | DIASTOLIC BLOOD PRESSURE: 78 MMHG | HEIGHT: 60 IN | HEART RATE: 98 BPM | WEIGHT: 227 LBS | OXYGEN SATURATION: 95 %

## 2024-04-01 DIAGNOSIS — J45.51 SEVERE PERSISTENT ASTHMA WITH ACUTE EXACERBATION: ICD-10-CM

## 2024-04-01 DIAGNOSIS — R06.03 ACUTE RESPIRATORY DISTRESS: Primary | ICD-10-CM

## 2024-04-01 DIAGNOSIS — R06.00 ACUTE DYSPNEA: ICD-10-CM

## 2024-04-01 DIAGNOSIS — J98.01 ACUTE BRONCHOSPASM: ICD-10-CM

## 2024-04-01 LAB
ALBUMIN SERPL-MCNC: 3.2 G/DL (ref 3.5–5)
ALBUMIN/GLOB SERPL: 0.8 (ref 1.1–2.2)
ALP SERPL-CCNC: 120 U/L (ref 45–117)
ALT SERPL-CCNC: 93 U/L (ref 12–78)
ANION GAP SERPL CALC-SCNC: 12 MMOL/L (ref 5–15)
AST SERPL-CCNC: 33 U/L (ref 15–37)
BASE EXCESS BLDV CALC-SCNC: 0.5 MMOL/L
BASOPHILS # BLD: 0 K/UL (ref 0–0.1)
BASOPHILS NFR BLD: 0 % (ref 0–1)
BDY SITE: ABNORMAL
BILIRUB SERPL-MCNC: 0.3 MG/DL (ref 0.2–1)
BREATHS.SPONTANEOUS ON VENT: 22
BUN SERPL-MCNC: 21 MG/DL (ref 6–20)
BUN/CREAT SERPL: 22 (ref 12–20)
CALCIUM SERPL-MCNC: 8.5 MG/DL (ref 8.5–10.1)
CHLORIDE SERPL-SCNC: 105 MMOL/L (ref 97–108)
CO2 SERPL-SCNC: 25 MMOL/L (ref 21–32)
CREAT SERPL-MCNC: 0.94 MG/DL (ref 0.55–1.02)
DIFFERENTIAL METHOD BLD: ABNORMAL
EKG ATRIAL RATE: 92 BPM
EKG DIAGNOSIS: NORMAL
EKG P-R INTERVAL: 146 MS
EKG Q-T INTERVAL: 364 MS
EKG QRS DURATION: 76 MS
EKG QTC CALCULATION (BAZETT): 450 MS
EKG R AXIS: 155 DEGREES
EKG T AXIS: 144 DEGREES
EKG VENTRICULAR RATE: 92 BPM
EOSINOPHIL # BLD: 0 K/UL (ref 0–0.4)
EOSINOPHIL NFR BLD: 0 % (ref 0–7)
ERYTHROCYTE [DISTWIDTH] IN BLOOD BY AUTOMATED COUNT: 13.4 % (ref 11.5–14.5)
GLOBULIN SER CALC-MCNC: 3.9 G/DL (ref 2–4)
GLUCOSE SERPL-MCNC: 92 MG/DL (ref 65–100)
HCG SERPL QL: NEGATIVE
HCO3 BLDV-SCNC: 23 MMOL/L (ref 23–28)
HCT VFR BLD AUTO: 35 % (ref 35–47)
HGB BLD-MCNC: 11.2 G/DL (ref 11.5–16)
IMM GRANULOCYTES # BLD AUTO: 0.1 K/UL (ref 0–0.04)
IMM GRANULOCYTES NFR BLD AUTO: 1 % (ref 0–0.5)
LYMPHOCYTES # BLD: 2.8 K/UL (ref 0.8–3.5)
LYMPHOCYTES NFR BLD: 26 % (ref 12–49)
MAGNESIUM SERPL-MCNC: 1.8 MG/DL (ref 1.6–2.4)
MCH RBC QN AUTO: 26.2 PG (ref 26–34)
MCHC RBC AUTO-ENTMCNC: 32 G/DL (ref 30–36.5)
MCV RBC AUTO: 82 FL (ref 80–99)
MONOCYTES # BLD: 0.9 K/UL (ref 0–1)
MONOCYTES NFR BLD: 8 % (ref 5–13)
NEUTS SEG # BLD: 7.1 K/UL (ref 1.8–8)
NEUTS SEG NFR BLD: 65 % (ref 32–75)
NRBC # BLD: 0 K/UL (ref 0–0.01)
NRBC BLD-RTO: 0 PER 100 WBC
PCO2 BLDV: 31.2 MMHG (ref 41–51)
PH BLDV: 7.49 (ref 7.32–7.42)
PLATELET # BLD AUTO: 404 K/UL (ref 150–400)
PMV BLD AUTO: 9.6 FL (ref 8.9–12.9)
PO2 BLDV: 53 MMHG (ref 25–40)
POTASSIUM SERPL-SCNC: 3.8 MMOL/L (ref 3.5–5.1)
PROT SERPL-MCNC: 7.1 G/DL (ref 6.4–8.2)
RBC # BLD AUTO: 4.27 M/UL (ref 3.8–5.2)
SAO2 % BLDV: 90 % (ref 65–88)
SAO2% DEVICE SAO2% SENSOR NAME: ABNORMAL
SODIUM SERPL-SCNC: 142 MMOL/L (ref 136–145)
SPECIMEN SITE: ABNORMAL
WBC # BLD AUTO: 10.9 K/UL (ref 3.6–11)

## 2024-04-01 PROCEDURE — 96365 THER/PROPH/DIAG IV INF INIT: CPT

## 2024-04-01 PROCEDURE — 93005 ELECTROCARDIOGRAM TRACING: CPT | Performed by: EMERGENCY MEDICINE

## 2024-04-01 PROCEDURE — 2580000003 HC RX 258: Performed by: EMERGENCY MEDICINE

## 2024-04-01 PROCEDURE — 71045 X-RAY EXAM CHEST 1 VIEW: CPT

## 2024-04-01 PROCEDURE — 36415 COLL VENOUS BLD VENIPUNCTURE: CPT

## 2024-04-01 PROCEDURE — 6360000002 HC RX W HCPCS: Performed by: EMERGENCY MEDICINE

## 2024-04-01 PROCEDURE — 84703 CHORIONIC GONADOTROPIN ASSAY: CPT

## 2024-04-01 PROCEDURE — 93010 ELECTROCARDIOGRAM REPORT: CPT | Performed by: INTERNAL MEDICINE

## 2024-04-01 PROCEDURE — 80053 COMPREHEN METABOLIC PANEL: CPT

## 2024-04-01 PROCEDURE — 96375 TX/PRO/DX INJ NEW DRUG ADDON: CPT

## 2024-04-01 PROCEDURE — 94664 DEMO&/EVAL PT USE INHALER: CPT

## 2024-04-01 PROCEDURE — 82803 BLOOD GASES ANY COMBINATION: CPT

## 2024-04-01 PROCEDURE — 6370000000 HC RX 637 (ALT 250 FOR IP): Performed by: EMERGENCY MEDICINE

## 2024-04-01 PROCEDURE — 99285 EMERGENCY DEPT VISIT HI MDM: CPT

## 2024-04-01 PROCEDURE — 96361 HYDRATE IV INFUSION ADD-ON: CPT

## 2024-04-01 PROCEDURE — 94640 AIRWAY INHALATION TREATMENT: CPT

## 2024-04-01 PROCEDURE — 85025 COMPLETE CBC W/AUTO DIFF WBC: CPT

## 2024-04-01 PROCEDURE — 83735 ASSAY OF MAGNESIUM: CPT

## 2024-04-01 RX ORDER — PREDNISONE 50 MG/1
50 TABLET ORAL DAILY
Qty: 5 TABLET | Refills: 0 | Status: SHIPPED | OUTPATIENT
Start: 2024-04-01 | End: 2024-04-06

## 2024-04-01 RX ORDER — ALBUTEROL SULFATE 90 UG/1
2 AEROSOL, METERED RESPIRATORY (INHALATION) 4 TIMES DAILY PRN
Qty: 54 G | Refills: 1 | Status: SHIPPED | OUTPATIENT
Start: 2024-04-01

## 2024-04-01 RX ORDER — GUAIFENESIN 600 MG/1
600 TABLET, EXTENDED RELEASE ORAL
Status: COMPLETED | OUTPATIENT
Start: 2024-04-01 | End: 2024-04-01

## 2024-04-01 RX ORDER — IPRATROPIUM BROMIDE AND ALBUTEROL SULFATE 2.5; .5 MG/3ML; MG/3ML
2 SOLUTION RESPIRATORY (INHALATION)
Status: COMPLETED | OUTPATIENT
Start: 2024-04-01 | End: 2024-04-01

## 2024-04-01 RX ORDER — 0.9 % SODIUM CHLORIDE 0.9 %
1000 INTRAVENOUS SOLUTION INTRAVENOUS ONCE
Status: COMPLETED | OUTPATIENT
Start: 2024-04-01 | End: 2024-04-01

## 2024-04-01 RX ORDER — MAGNESIUM SULFATE IN WATER 40 MG/ML
2000 INJECTION, SOLUTION INTRAVENOUS
Status: COMPLETED | OUTPATIENT
Start: 2024-04-01 | End: 2024-04-01

## 2024-04-01 RX ORDER — ALBUTEROL SULFATE 90 UG/1
2 AEROSOL, METERED RESPIRATORY (INHALATION) EVERY 6 HOURS PRN
Status: DISCONTINUED | OUTPATIENT
Start: 2024-04-01 | End: 2024-04-01 | Stop reason: HOSPADM

## 2024-04-01 RX ORDER — ALBUTEROL SULFATE 2.5 MG/3ML
2.5 SOLUTION RESPIRATORY (INHALATION)
Status: COMPLETED | OUTPATIENT
Start: 2024-04-01 | End: 2024-04-01

## 2024-04-01 RX ORDER — GUAIFENESIN 600 MG/1
600 TABLET, EXTENDED RELEASE ORAL 2 TIMES DAILY
Qty: 30 TABLET | Refills: 0 | Status: SHIPPED | OUTPATIENT
Start: 2024-04-01 | End: 2024-04-16

## 2024-04-01 RX ADMIN — GUAIFENESIN 600 MG: 600 TABLET ORAL at 03:47

## 2024-04-01 RX ADMIN — ALBUTEROL SULFATE 2.5 MG: 2.5 SOLUTION RESPIRATORY (INHALATION) at 03:12

## 2024-04-01 RX ADMIN — MAGNESIUM SULFATE HEPTAHYDRATE 2000 MG: 40 INJECTION, SOLUTION INTRAVENOUS at 02:09

## 2024-04-01 RX ADMIN — IPRATROPIUM BROMIDE AND ALBUTEROL SULFATE 2 DOSE: .5; 3 SOLUTION RESPIRATORY (INHALATION) at 02:08

## 2024-04-01 RX ADMIN — WATER 125 MG: 1 INJECTION INTRAMUSCULAR; INTRAVENOUS; SUBCUTANEOUS at 02:08

## 2024-04-01 RX ADMIN — SODIUM CHLORIDE 1000 ML: 900 INJECTION, SOLUTION INTRAVENOUS at 02:59

## 2024-04-01 ASSESSMENT — ENCOUNTER SYMPTOMS
VOMITING: 0
COUGH: 1
ABDOMINAL PAIN: 0
RHINORRHEA: 0
EYE PAIN: 0
SORE THROAT: 0
NAUSEA: 0
CHEST TIGHTNESS: 1
SHORTNESS OF BREATH: 1
DIARRHEA: 0

## 2024-04-01 ASSESSMENT — PAIN DESCRIPTION - PAIN TYPE: TYPE: ACUTE PAIN

## 2024-04-01 ASSESSMENT — PAIN DESCRIPTION - LOCATION: LOCATION: CHEST

## 2024-04-01 ASSESSMENT — PAIN SCALES - GENERAL: PAINLEVEL_OUTOF10: 7

## 2024-04-01 ASSESSMENT — PAIN DESCRIPTION - ORIENTATION: ORIENTATION: MID

## 2024-04-01 ASSESSMENT — PAIN - FUNCTIONAL ASSESSMENT: PAIN_FUNCTIONAL_ASSESSMENT: 0-10

## 2024-04-01 ASSESSMENT — PAIN DESCRIPTION - DESCRIPTORS: DESCRIPTORS: TIGHTNESS

## 2024-04-01 NOTE — DISCHARGE INSTRUCTIONS
Thank You!    It was a pleasure taking care of you in our Emergency Department today. We know that when you come to [facility], you are entrusting us with your health, comfort, and safety. Our physicians and nurses honor that trust, and truly appreciate the opportunity to care for you and your loved ones.      We also value your feedback. If you receive a survey about your Emergency Department experience today, please fill it out.  We care about our patients' feedback, and we listen to what you have to say. Thank you.    Dr. Lazaro Faye M.D.      ____________________________________________________________________  I have included a copy of your lab results and/or radiologic studies from today's visit so you can have them easily available at your follow-up visit. We hope you feel better and please do not hesitate to contact the ED if you have any questions at all!    Recent Results (from the past 12 hour(s))   EKG 12 Lead    Collection Time: 04/01/24  2:00 AM   Result Value Ref Range    Ventricular Rate 92 BPM    Atrial Rate 92 BPM    P-R Interval 146 ms    QRS Duration 76 ms    Q-T Interval 364 ms    QTc Calculation (Bazett) 450 ms    R Axis 155 degrees    T Axis 144 degrees    Diagnosis       Normal sinus rhythm  Right axis deviation  Nonspecific ST and T wave abnormality  Abnormal ECG  When compared with ECG of 06-NOV-2023 18:06,  QRS axis shifted right  T wave inversion now evident in Lateral leads     CBC with Auto Differential    Collection Time: 04/01/24  2:05 AM   Result Value Ref Range    WBC 10.9 3.6 - 11.0 K/uL    RBC 4.27 3.80 - 5.20 M/uL    Hemoglobin 11.2 (L) 11.5 - 16.0 g/dL    Hematocrit 35.0 35.0 - 47.0 %    MCV 82.0 80.0 - 99.0 FL    MCH 26.2 26.0 - 34.0 PG    MCHC 32.0 30.0 - 36.5 g/dL    RDW 13.4 11.5 - 14.5 %    Platelets 404 (H) 150 - 400 K/uL    MPV 9.6 8.9 - 12.9 FL    Nucleated RBCs 0.0 0  WBC    nRBC 0.00 0.00 - 0.01 K/uL    Neutrophils % 65 32 - 75 %

## 2024-05-28 ENCOUNTER — APPOINTMENT (RX ONLY)
Dept: URBAN - METROPOLITAN AREA CLINIC 122 | Facility: CLINIC | Age: 34
Setting detail: DERMATOLOGY
End: 2024-05-28

## 2024-05-28 DIAGNOSIS — D485 NEOPLASM OF UNCERTAIN BEHAVIOR OF SKIN: ICD-10-CM

## 2024-05-28 PROBLEM — D48.5 NEOPLASM OF UNCERTAIN BEHAVIOR OF SKIN: Status: ACTIVE | Noted: 2024-05-28

## 2024-05-28 PROCEDURE — ? COUNSELING

## 2024-05-28 PROCEDURE — ? BIOPSY BY SHAVE METHOD

## 2024-05-28 PROCEDURE — 11102 TANGNTL BX SKIN SINGLE LES: CPT

## 2024-05-28 ASSESSMENT — LOCATION ZONE DERM: LOCATION ZONE: LEG

## 2024-05-28 ASSESSMENT — LOCATION DETAILED DESCRIPTION DERM: LOCATION DETAILED: RIGHT ANTERIOR PROXIMAL THIGH

## 2024-05-28 ASSESSMENT — LOCATION SIMPLE DESCRIPTION DERM: LOCATION SIMPLE: RIGHT THIGH

## 2024-05-28 NOTE — PROCEDURE: BIOPSY BY SHAVE METHOD
Detail Level: Detailed
Depth Of Biopsy: dermis
Was A Bandage Applied: Yes
Size Of Lesion In Cm: 0.4
X Size Of Lesion In Cm: 0
Biopsy Type: H and E
Biopsy Method: Dermablade
Anesthesia Type: 1% lidocaine with epinephrine
Anesthesia Volume In Cc: 0.5
Hemostasis: Electrocautery
Wound Care: Petrolatum
Dressing: bandage
Destruction After The Procedure: No
Type Of Destruction Used: Curettage
Curettage Text: The wound bed was treated with curettage after the biopsy was performed.
Cryotherapy Text: The wound bed was treated with cryotherapy after the biopsy was performed.
Electrodesiccation Text: The wound bed was treated with electrodesiccation after the biopsy was performed.
Electrodesiccation And Curettage Text: The wound bed was treated with electrodesiccation and curettage after the biopsy was performed.
Silver Nitrate Text: The wound bed was treated with silver nitrate after the biopsy was performed.
Lab: [id number]
Lab Facility: [id number]
Consent: Written consent was obtained and risks were reviewed including but not limited to scarring, infection, bleeding, scabbing, incomplete removal, nerve damage and allergy to anesthesia.
Post-Care Instructions: I reviewed with the patient in detail post-care instructions. Patient is to keep the biopsy site dry overnight, and then apply bacitracin twice daily until healed. Patient may apply hydrogen peroxide soaks to remove any crusting.
Notification Instructions: Patient will be notified of biopsy results. However, patient instructed to call the office if not contacted within 2 weeks.
Billing Type: Third-Party Bill
Information: Selecting Yes will display possible errors in your note based on the variables you have selected. This validation is only offered as a suggestion for you. PLEASE NOTE THAT THE VALIDATION TEXT WILL BE REMOVED WHEN YOU FINALIZE YOUR NOTE. IF YOU WANT TO FAX A PRELIMINARY NOTE YOU WILL NEED TO TOGGLE THIS TO 'NO' IF YOU DO NOT WANT IT IN YOUR FAXED NOTE.

## 2024-06-11 ENCOUNTER — APPOINTMENT (RX ONLY)
Dept: URBAN - METROPOLITAN AREA CLINIC 122 | Facility: CLINIC | Age: 34
Setting detail: DERMATOLOGY
End: 2024-06-11

## 2024-06-11 DIAGNOSIS — D22 MELANOCYTIC NEVI: ICD-10-CM

## 2024-06-11 PROBLEM — D48.5 NEOPLASM OF UNCERTAIN BEHAVIOR OF SKIN: Status: ACTIVE | Noted: 2024-06-11

## 2024-06-11 PROCEDURE — 11402 EXC TR-EXT B9+MARG 1.1-2 CM: CPT

## 2024-06-11 PROCEDURE — 12032 INTMD RPR S/A/T/EXT 2.6-7.5: CPT

## 2024-06-11 PROCEDURE — ? EXCISION

## 2024-06-11 ASSESSMENT — LOCATION DETAILED DESCRIPTION DERM: LOCATION DETAILED: LEFT MID-UPPER BACK

## 2024-06-11 ASSESSMENT — LOCATION SIMPLE DESCRIPTION DERM: LOCATION SIMPLE: LEFT UPPER BACK

## 2024-06-11 ASSESSMENT — LOCATION ZONE DERM: LOCATION ZONE: TRUNK

## 2024-06-11 NOTE — HPI: SKIN LESION (DYSPLASTIC NEVUS)
Is This A New Presentation, Or A Follow-Up?: Dysplastic Nevus
Additional History: DN has been excised before by previous dermatologist.

## 2024-06-11 NOTE — PROCEDURE: EXCISION

## 2024-06-21 ENCOUNTER — APPOINTMENT (RX ONLY)
Dept: URBAN - METROPOLITAN AREA CLINIC 122 | Facility: CLINIC | Age: 34
Setting detail: DERMATOLOGY
End: 2024-06-21

## 2024-06-21 DIAGNOSIS — Z48.02 ENCOUNTER FOR REMOVAL OF SUTURES: ICD-10-CM

## 2024-06-21 PROCEDURE — ? SUTURE REMOVAL (GLOBAL PERIOD)

## 2024-06-21 PROCEDURE — 99024 POSTOP FOLLOW-UP VISIT: CPT

## 2024-06-21 ASSESSMENT — LOCATION SIMPLE DESCRIPTION DERM: LOCATION SIMPLE: LEFT UPPER BACK

## 2024-06-21 ASSESSMENT — LOCATION ZONE DERM: LOCATION ZONE: TRUNK

## 2024-06-21 ASSESSMENT — LOCATION DETAILED DESCRIPTION DERM: LOCATION DETAILED: LEFT MID-UPPER BACK

## 2024-06-21 NOTE — PROCEDURE: SUTURE REMOVAL (GLOBAL PERIOD)
Detail Level: Detailed
Add 09923 Cpt? (Important Note: In 2017 The Use Of 27528 Is Being Tracked By Cms To Determine Future Global Period Reimbursement For Global Periods): yes

## 2024-08-01 ENCOUNTER — APPOINTMENT (RX ONLY)
Dept: URBAN - METROPOLITAN AREA CLINIC 122 | Facility: CLINIC | Age: 34
Setting detail: DERMATOLOGY
End: 2024-08-01

## 2024-08-01 DIAGNOSIS — Z71.89 OTHER SPECIFIED COUNSELING: ICD-10-CM

## 2024-08-01 DIAGNOSIS — D18.0 HEMANGIOMA: ICD-10-CM

## 2024-08-01 DIAGNOSIS — Z87.2 PERSONAL HISTORY OF DISEASES OF THE SKIN AND SUBCUTANEOUS TISSUE: ICD-10-CM

## 2024-08-01 DIAGNOSIS — L81.2 FRECKLES: ICD-10-CM

## 2024-08-01 DIAGNOSIS — D22 MELANOCYTIC NEVI: ICD-10-CM

## 2024-08-01 DIAGNOSIS — L81.4 OTHER MELANIN HYPERPIGMENTATION: ICD-10-CM

## 2024-08-01 DIAGNOSIS — Z80.8 FAMILY HISTORY OF MALIGNANT NEOPLASM OF OTHER ORGANS OR SYSTEMS: ICD-10-CM

## 2024-08-01 DIAGNOSIS — Z85.828 PERSONAL HISTORY OF OTHER MALIGNANT NEOPLASM OF SKIN: ICD-10-CM

## 2024-08-01 PROBLEM — D22.71 MELANOCYTIC NEVI OF RIGHT LOWER LIMB, INCLUDING HIP: Status: ACTIVE | Noted: 2024-08-01

## 2024-08-01 PROBLEM — D22.62 MELANOCYTIC NEVI OF LEFT UPPER LIMB, INCLUDING SHOULDER: Status: ACTIVE | Noted: 2024-08-01

## 2024-08-01 PROBLEM — D22.61 MELANOCYTIC NEVI OF RIGHT UPPER LIMB, INCLUDING SHOULDER: Status: ACTIVE | Noted: 2024-08-01

## 2024-08-01 PROBLEM — D22.39 MELANOCYTIC NEVI OF OTHER PARTS OF FACE: Status: ACTIVE | Noted: 2024-08-01

## 2024-08-01 PROBLEM — D22.72 MELANOCYTIC NEVI OF LEFT LOWER LIMB, INCLUDING HIP: Status: ACTIVE | Noted: 2024-08-01

## 2024-08-01 PROBLEM — D22.5 MELANOCYTIC NEVI OF TRUNK: Status: ACTIVE | Noted: 2024-08-01

## 2024-08-01 PROBLEM — D18.01 HEMANGIOMA OF SKIN AND SUBCUTANEOUS TISSUE: Status: ACTIVE | Noted: 2024-08-01

## 2024-08-01 PROCEDURE — ? COUNSELING

## 2024-08-01 PROCEDURE — 99213 OFFICE O/P EST LOW 20 MIN: CPT

## 2024-08-01 ASSESSMENT — LOCATION DETAILED DESCRIPTION DERM
LOCATION DETAILED: RIGHT ELBOW
LOCATION DETAILED: RIGHT DISTAL PRETIBIAL REGION
LOCATION DETAILED: RIGHT DISTAL POSTERIOR UPPER ARM
LOCATION DETAILED: LEFT CENTRAL MALAR CHEEK
LOCATION DETAILED: LEFT PROXIMAL PRETIBIAL REGION
LOCATION DETAILED: LEFT PROXIMAL DORSAL FOREARM
LOCATION DETAILED: SUPERIOR THORACIC SPINE
LOCATION DETAILED: RIGHT PROXIMAL DORSAL FOREARM
LOCATION DETAILED: LEFT INFERIOR MEDIAL FOREHEAD
LOCATION DETAILED: LEFT FOREHEAD
LOCATION DETAILED: RIGHT SUPERIOR MEDIAL UPPER BACK
LOCATION DETAILED: SUPERIOR MID FOREHEAD
LOCATION DETAILED: RIGHT MEDIAL INFERIOR CHEST
LOCATION DETAILED: UPPER STERNUM
LOCATION DETAILED: LEFT ELBOW
LOCATION DETAILED: RIGHT SUPERIOR UPPER BACK
LOCATION DETAILED: LEFT PROXIMAL CALF
LOCATION DETAILED: RIGHT CENTRAL MALAR CHEEK
LOCATION DETAILED: RIGHT PROXIMAL CALF
LOCATION DETAILED: EPIGASTRIC SKIN
LOCATION DETAILED: LEFT DISTAL POSTERIOR UPPER ARM
LOCATION DETAILED: RIGHT PROXIMAL PRETIBIAL REGION

## 2024-08-01 ASSESSMENT — LOCATION SIMPLE DESCRIPTION DERM
LOCATION SIMPLE: CHEST
LOCATION SIMPLE: RIGHT CALF
LOCATION SIMPLE: ABDOMEN
LOCATION SIMPLE: RIGHT ELBOW
LOCATION SIMPLE: LEFT CALF
LOCATION SIMPLE: RIGHT PRETIBIAL REGION
LOCATION SIMPLE: RIGHT CHEEK
LOCATION SIMPLE: LEFT CHEEK
LOCATION SIMPLE: RIGHT UPPER BACK
LOCATION SIMPLE: UPPER BACK
LOCATION SIMPLE: RIGHT FOREARM
LOCATION SIMPLE: LEFT PRETIBIAL REGION
LOCATION SIMPLE: LEFT FOREARM
LOCATION SIMPLE: LEFT POSTERIOR UPPER ARM
LOCATION SIMPLE: LEFT ELBOW
LOCATION SIMPLE: LEFT FOREHEAD
LOCATION SIMPLE: SUPERIOR FOREHEAD
LOCATION SIMPLE: RIGHT POSTERIOR UPPER ARM

## 2024-08-01 ASSESSMENT — LOCATION ZONE DERM
LOCATION ZONE: FACE
LOCATION ZONE: TRUNK
LOCATION ZONE: ARM
LOCATION ZONE: LEG

## 2024-08-01 NOTE — HPI: SKIN CHECK
What Is The Reason For Today's Visit?: the risk of recurrence, and the development of new lesions
Additional History: Pt states she has “odd freckles” on RT thigh

## 2024-08-01 NOTE — PROCEDURE: MIPS QUALITY
Quality 130: Documentation Of Current Medications In The Medical Record: Current Medications Documented
Detail Level: Detailed
Quality 431: Preventive Care And Screening: Unhealthy Alcohol Use - Screening: Patient not identified as an unhealthy alcohol user when screened for unhealthy alcohol use using a systematic screening method
Quality 226: Preventive Care And Screening: Tobacco Use: Screening And Cessation Intervention: Patient screened for tobacco use and is an ex/non-smoker
alert

## 2025-03-17 ENCOUNTER — APPOINTMENT (OUTPATIENT)
Dept: URBAN - METROPOLITAN AREA CLINIC 122 | Facility: CLINIC | Age: 35
Setting detail: DERMATOLOGY
End: 2025-03-17

## 2025-03-17 DIAGNOSIS — L82.1 OTHER SEBORRHEIC KERATOSIS: ICD-10-CM

## 2025-03-17 PROCEDURE — ? COUNSELING

## 2025-03-17 PROCEDURE — 99212 OFFICE O/P EST SF 10 MIN: CPT

## 2025-03-17 ASSESSMENT — LOCATION DETAILED DESCRIPTION DERM: LOCATION DETAILED: RIGHT PROXIMAL POSTERIOR UPPER ARM

## 2025-03-17 ASSESSMENT — LOCATION SIMPLE DESCRIPTION DERM: LOCATION SIMPLE: RIGHT POSTERIOR UPPER ARM

## 2025-03-17 ASSESSMENT — LOCATION ZONE DERM: LOCATION ZONE: ARM

## 2025-08-11 ENCOUNTER — APPOINTMENT (OUTPATIENT)
Dept: URBAN - METROPOLITAN AREA CLINIC 122 | Facility: CLINIC | Age: 35
Setting detail: DERMATOLOGY
End: 2025-08-11

## 2025-08-11 DIAGNOSIS — Z80.8 FAMILY HISTORY OF MALIGNANT NEOPLASM OF OTHER ORGANS OR SYSTEMS: ICD-10-CM

## 2025-08-11 DIAGNOSIS — L82.1 OTHER SEBORRHEIC KERATOSIS: ICD-10-CM

## 2025-08-11 DIAGNOSIS — Z71.89 OTHER SPECIFIED COUNSELING: ICD-10-CM

## 2025-08-11 DIAGNOSIS — Z87.2 PERSONAL HISTORY OF DISEASES OF THE SKIN AND SUBCUTANEOUS TISSUE: ICD-10-CM

## 2025-08-11 DIAGNOSIS — L30.9 DERMATITIS, UNSPECIFIED: ICD-10-CM

## 2025-08-11 DIAGNOSIS — D22 MELANOCYTIC NEVI: ICD-10-CM

## 2025-08-11 DIAGNOSIS — L81.2 FRECKLES: ICD-10-CM

## 2025-08-11 DIAGNOSIS — D18.0 HEMANGIOMA: ICD-10-CM

## 2025-08-11 DIAGNOSIS — Z85.828 PERSONAL HISTORY OF OTHER MALIGNANT NEOPLASM OF SKIN: ICD-10-CM

## 2025-08-11 DIAGNOSIS — L81.4 OTHER MELANIN HYPERPIGMENTATION: ICD-10-CM

## 2025-08-11 PROBLEM — D22.5 MELANOCYTIC NEVI OF TRUNK: Status: ACTIVE | Noted: 2025-08-11

## 2025-08-11 PROBLEM — D22.61 MELANOCYTIC NEVI OF RIGHT UPPER LIMB, INCLUDING SHOULDER: Status: ACTIVE | Noted: 2025-08-11

## 2025-08-11 PROBLEM — D22.72 MELANOCYTIC NEVI OF LEFT LOWER LIMB, INCLUDING HIP: Status: ACTIVE | Noted: 2025-08-11

## 2025-08-11 PROBLEM — D22.62 MELANOCYTIC NEVI OF LEFT UPPER LIMB, INCLUDING SHOULDER: Status: ACTIVE | Noted: 2025-08-11

## 2025-08-11 PROBLEM — D22.71 MELANOCYTIC NEVI OF RIGHT LOWER LIMB, INCLUDING HIP: Status: ACTIVE | Noted: 2025-08-11

## 2025-08-11 PROBLEM — D18.01 HEMANGIOMA OF SKIN AND SUBCUTANEOUS TISSUE: Status: ACTIVE | Noted: 2025-08-11

## 2025-08-11 PROBLEM — D22.39 MELANOCYTIC NEVI OF OTHER PARTS OF FACE: Status: ACTIVE | Noted: 2025-08-11

## 2025-08-11 PROCEDURE — ? COUNSELING

## 2025-08-11 ASSESSMENT — LOCATION DETAILED DESCRIPTION DERM
LOCATION DETAILED: EPIGASTRIC SKIN
LOCATION DETAILED: RIGHT PROXIMAL CALF
LOCATION DETAILED: RIGHT POPLITEAL SKIN
LOCATION DETAILED: RIGHT PROXIMAL DORSAL FOREARM
LOCATION DETAILED: LEFT SUPERIOR UPPER BACK
LOCATION DETAILED: RIGHT DISTAL POSTERIOR UPPER ARM
LOCATION DETAILED: LEFT DISTAL POSTERIOR UPPER ARM
LOCATION DETAILED: LEFT PROXIMAL DORSAL FOREARM
LOCATION DETAILED: LEFT DISTAL PRETIBIAL REGION
LOCATION DETAILED: RIGHT DORSAL FOOT
LOCATION DETAILED: RIGHT CENTRAL MALAR CHEEK
LOCATION DETAILED: RIGHT SUPERIOR MEDIAL UPPER BACK
LOCATION DETAILED: LEFT FOREHEAD
LOCATION DETAILED: LEFT BUTTOCK
LOCATION DETAILED: RIGHT BUTTOCK
LOCATION DETAILED: RIGHT ELBOW
LOCATION DETAILED: RIGHT PROXIMAL PRETIBIAL REGION
LOCATION DETAILED: LEFT PROXIMAL PRETIBIAL REGION
LOCATION DETAILED: RIGHT VENTRAL WRIST
LOCATION DETAILED: RIGHT MEDIAL INFERIOR CHEST
LOCATION DETAILED: UPPER STERNUM
LOCATION DETAILED: LEFT INFERIOR MEDIAL FOREHEAD
LOCATION DETAILED: LEFT CENTRAL MALAR CHEEK
LOCATION DETAILED: LEFT POPLITEAL SKIN
LOCATION DETAILED: RIGHT DISTAL PRETIBIAL REGION
LOCATION DETAILED: RIGHT FOREHEAD
LOCATION DETAILED: LEFT LATERAL DORSAL FOOT
LOCATION DETAILED: SUPERIOR THORACIC SPINE
LOCATION DETAILED: LEFT ELBOW
LOCATION DETAILED: RIGHT SUPERIOR UPPER BACK
LOCATION DETAILED: LEFT PROXIMAL CALF
LOCATION DETAILED: SUPERIOR MID FOREHEAD

## 2025-08-11 ASSESSMENT — LOCATION SIMPLE DESCRIPTION DERM
LOCATION SIMPLE: RIGHT POSTERIOR UPPER ARM
LOCATION SIMPLE: RIGHT BUTTOCK
LOCATION SIMPLE: RIGHT FOOT
LOCATION SIMPLE: LEFT CHEEK
LOCATION SIMPLE: LEFT ELBOW
LOCATION SIMPLE: SUPERIOR FOREHEAD
LOCATION SIMPLE: RIGHT CHEEK
LOCATION SIMPLE: LEFT BUTTOCK
LOCATION SIMPLE: CHEST
LOCATION SIMPLE: LEFT POPLITEAL SKIN
LOCATION SIMPLE: RIGHT CALF
LOCATION SIMPLE: RIGHT PRETIBIAL REGION
LOCATION SIMPLE: UPPER BACK
LOCATION SIMPLE: RIGHT ELBOW
LOCATION SIMPLE: LEFT CALF
LOCATION SIMPLE: LEFT FOREHEAD
LOCATION SIMPLE: RIGHT WRIST
LOCATION SIMPLE: LEFT FOOT
LOCATION SIMPLE: RIGHT POPLITEAL SKIN
LOCATION SIMPLE: LEFT PRETIBIAL REGION
LOCATION SIMPLE: LEFT FOREARM
LOCATION SIMPLE: ABDOMEN
LOCATION SIMPLE: LEFT POSTERIOR UPPER ARM
LOCATION SIMPLE: RIGHT FOREHEAD
LOCATION SIMPLE: LEFT UPPER BACK
LOCATION SIMPLE: RIGHT FOREARM
LOCATION SIMPLE: RIGHT UPPER BACK

## 2025-08-11 ASSESSMENT — LOCATION ZONE DERM
LOCATION ZONE: TRUNK
LOCATION ZONE: ARM
LOCATION ZONE: LEG
LOCATION ZONE: FACE
LOCATION ZONE: FEET